# Patient Record
Sex: FEMALE | Race: WHITE | HISPANIC OR LATINO | Employment: STUDENT | URBAN - METROPOLITAN AREA
[De-identification: names, ages, dates, MRNs, and addresses within clinical notes are randomized per-mention and may not be internally consistent; named-entity substitution may affect disease eponyms.]

---
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
## 2021-05-02 ENCOUNTER — TELEPHONE (OUTPATIENT)
Dept: EMERGENCY MEDICINE | Facility: CLINIC | Age: 21
End: 2021-05-02

## 2021-05-02 ENCOUNTER — HOSPITAL ENCOUNTER (EMERGENCY)
Facility: CLINIC | Age: 21
Discharge: HOME | End: 2021-05-02
Attending: EMERGENCY MEDICINE | Admitting: EMERGENCY MEDICINE
Payer: COMMERCIAL

## 2021-05-02 VITALS
SYSTOLIC BLOOD PRESSURE: 123 MMHG | HEART RATE: 84 BPM | OXYGEN SATURATION: 99 % | BODY MASS INDEX: 24.15 KG/M2 | RESPIRATION RATE: 16 BRPM | TEMPERATURE: 99.1 F | DIASTOLIC BLOOD PRESSURE: 88 MMHG | HEIGHT: 60 IN | WEIGHT: 123 LBS

## 2021-05-02 DIAGNOSIS — J06.9 URI WITH COUGH AND CONGESTION: ICD-10-CM

## 2021-05-02 LAB
DEPRECATED S PYO AG THROAT QL EIA: NEGATIVE
FLUAV RNA RESP QL NAA+PROBE: NEGATIVE
FLUBV RNA RESP QL NAA+PROBE: NEGATIVE
LABORATORY COMMENT REPORT: ABNORMAL
RSV RNA SPEC QL NAA+PROBE: ABNORMAL
SARS-COV-2 RNA RESP QL NAA+PROBE: POSITIVE
SPECIMEN SOURCE: ABNORMAL
SPECIMEN SOURCE: NORMAL
SPECIMEN SOURCE: NORMAL
STREP GROUP A PCR: NOT DETECTED

## 2021-05-02 PROCEDURE — 99283 EMERGENCY DEPT VISIT LOW MDM: CPT

## 2021-05-02 PROCEDURE — 87651 STREP A DNA AMP PROBE: CPT | Performed by: EMERGENCY MEDICINE

## 2021-05-02 PROCEDURE — 87636 SARSCOV2 & INF A&B AMP PRB: CPT | Performed by: EMERGENCY MEDICINE

## 2021-05-02 PROCEDURE — 250N000012 HC RX MED GY IP 250 OP 636 PS 637: Performed by: EMERGENCY MEDICINE

## 2021-05-02 PROCEDURE — C9803 HOPD COVID-19 SPEC COLLECT: HCPCS

## 2021-05-02 PROCEDURE — 999N001174 HC STATISTIC STREP A RAPID: Performed by: EMERGENCY MEDICINE

## 2021-05-02 PROCEDURE — 250N000013 HC RX MED GY IP 250 OP 250 PS 637: Performed by: EMERGENCY MEDICINE

## 2021-05-02 RX ORDER — DEXAMETHASONE 4 MG/1
12 TABLET ORAL ONCE
Status: COMPLETED | OUTPATIENT
Start: 2021-05-02 | End: 2021-05-02

## 2021-05-02 RX ORDER — IBUPROFEN 600 MG/1
600 TABLET, FILM COATED ORAL ONCE
Status: COMPLETED | OUTPATIENT
Start: 2021-05-02 | End: 2021-05-02

## 2021-05-02 RX ADMIN — IBUPROFEN 600 MG: 600 TABLET, FILM COATED ORAL at 16:36

## 2021-05-02 RX ADMIN — DEXAMETHASONE 12 MG: 4 TABLET ORAL at 16:36

## 2021-05-02 ASSESSMENT — ENCOUNTER SYMPTOMS
RHINORRHEA: 1
FEVER: 0
SORE THROAT: 1
COUGH: 1
HEADACHES: 1

## 2021-05-02 ASSESSMENT — MIFFLIN-ST. JEOR: SCORE: 1249.42

## 2021-05-02 NOTE — ED PROVIDER NOTES
History   Chief Complaint:  Pharyngitis (sore throat, stuffy nose, stuffy ears and headache since friday)     DAV Lovett is a 20 year old female with history of asthma who presents with pharyngitis. 2 days ago, she developed rhinorrhea and watery eyes. She initially attributed her symptoms and treated with Claritin D with no relief. Her symptoms continued and she tried another allergy medication yesterday. However, yesterday evening she developed a sore throat, headache, otalgia, and cough, which she states is mostly to clear mucous from her throat. She also notes that her headache is worsened by movement and she has been diaphoretic at night. She denies concern for pregnancy.     Review of Systems   Constitutional: Negative for fever.   HENT: Positive for ear pain, rhinorrhea and sore throat.    Respiratory: Positive for cough.    Neurological: Positive for headaches.   All other systems reviewed and are negative.      Allergies:  Flu virus vaccine  Penicillins     Medications:  Albuterol inhaler  Tessalon   Dulera inhaler  Omeprazole  Desyrel     Past Medical History:    Depression  Asthma      Social History:  Presents to the ED alone     Physical Exam     Patient Vitals for the past 24 hrs:   BP Temp Temp src Pulse Resp SpO2 Height Weight   05/02/21 1730 -- -- -- 84 -- 99 % -- --   05/02/21 1444 123/88 99.1  F (37.3  C) Oral 108 16 98 % 1.524 m (5') 55.8 kg (123 lb)     Physical Exam  ENT:                Normal TM, oropharynx w/posterior cobblestoning  LN:                   Tender cervical adenopathy  Resp:               Non-labored, CTAB  Cardiac:           RRR  Neuro:             Alert and cooperative  MSkel:             Moving all extremities  Skin:                No rash    Emergency Department Course   Laboratory:  Symptomatic Influenza A/B & SARS-CoV2 (COVID19) Virus PCR Multiplex: SARSCOVRES positive!, o/w negative     Streptococcus A Rapid screen: negative   Group A Streptococcus PCR in  progress     Emergency Department Course:  Reviewed:  I reviewed nursing notes, vitals, past medical history and care everywhere    Assessments:  1625 I obtained history and examined the patient as noted above.   1724 I rechecked the patient and explained findings.     Interventions:  1636 Decadron, 12 mg, PO  1636 ibuprofen, 600 mg, PO    Disposition:  The patient was discharged to home.     Impression & Plan     Medical Decision Making:    Raisa Lovett is a 20 year old female who presents to the emergency department today for evaluation of a constellation of upper respiratory symptoms. Differential includes seasonal allergies, viral infections, COVID-19, and strep. Strep testing came back negative. There was no evidence for associated bacterial infection, such as otitis media or pneumonia on exam. Symptomatic medications were discussed. A dose of Decadron was given to help with her symptoms and her sore throat. COVID-19 testing is pending at this time and I will give her a call back when those results return.     COVID testing returned positive after discharge.  I was going to call her but noted that the lab nurse already had a note in regarding a call back and COVID follow-up instructions.    Covid-19  Raisa Lovett was evaluated during a global COVID-19 pandemic, which necessitated consideration that the patient might be at risk for infection with the SARS-CoV-2 virus that causes COVID-19.   Applicable protocols for evaluation were followed during the patient's care. COVID-19 was considered as part of the patient's evaluation. The plan for testing is: a test was obtained during this visit.    Diagnosis:    ICD-10-CM    1. URI with cough and congestion  J06.9 Symptomatic Influenza A/B & SARS-CoV2 (COVID-19) Virus PCR Multiplex     Streptococcus A Rapid Scr w Reflx to PCR     Group A Streptococcus PCR Throat Swab     Group A Streptococcus PCR Throat Swab     Scribe Disclosure:  Zenia MARTINEZ,  am serving as a scribe at 4:20 PM on 5/2/2021 to document services personally performed by Izzy Corea MD based on my observations and the provider's statements to me.              Izzy Corea MD  05/02/21 8396

## 2021-05-02 NOTE — DISCHARGE INSTRUCTIONS
Prescription strength dosing instructions:  - 600mg ibuprofen (Advil, Motrin) every 6 hours as needed for fever/pain/inflammation.  Naprosyn (Naproxen, Aleve) works similarly and can be used instead, if preferred.  - 650mg acetaminophen (tylenol) every 4 hours or 1000mg every 6 hours (maximum of 4000mg in 24 hours).  Acetaminophen is often in combination over the counter and prescription pain medications.  Be sure to include this in daily total.        These medications work differently and can be used in combination.      - over the counter Afrin nasal spray twice a day for 3 days helps with congestion  - Claritin D daily or sudafed both help with congestion

## 2021-05-02 NOTE — TELEPHONE ENCOUNTER
"-Coronavirus (COVID-19) Notification    Caller Name (Patient, parent, daughter/son, grandparent, etc)  Raisa, patient    Reason for call  Notify of Positive Coronavirus (COVID-19) lab results, assess symptoms,  review  [facility] recommendations    Lab Result    Lab test:  2019-nCoV rRt-PCR or SARS-CoV-2 PCR    Oropharyngeal AND/OR nasopharyngeal swabs is POSITIVE for 2019-nCoV RNA/SARS-COV-2 PCR (COVID-19 virus)    RN Recommendations/Instructions per [facility] Coronavirus COVID-19 recommendations    Brief introduction script  Introduce self then review script:  \"I am calling on behalf of [employer].  We were notified that your Coronavirus test (COVID-19) for was POSITIVE for the virus.  I have some information to relay to you but first I wanted to mention that the [place] Dept of Health will be contacting you shortly [it's possible MD already called Patient] to talk to you more about how you are feeling and other people you have had contact with who might now also have the virus.  Also,  [facility] is Partnering with the [facility] for Covid-19 research, you may be contacted directly by research staff.\"    Assessment (Inquire about Patient's current symptoms)   Assessment   Current Symptoms at time of phone call: (if no symptoms, document No symptoms] Runny nose, ear pain, headache   Symptoms onset (if applicable) 4/30/2021     If at time of call, Patients symptoms hare worsened, the Patient should contact 911 or have someone drive them to Emergency Dept promptly:      If Patient calling 911, inform 911 personal that you have tested positive for the Coronavirus (COVID-19).  Place mask on and await 911 to arrive.    If Emergency Dept, If possible, please have another adult drive you to the Emergency Dept but you need to wear mask when in contact with other people.      Monoclonal Antibody Administration    You may be eligible to receive a new treatment with a monoclonal antibody for " "preventing hospitalization in patients at high risk for complications from COVID-19.   This medication is still experimental and available on a limited basis; it is given through an IV and must be given at an infusion center. Please note that not all people who are eligible will receive the medication since it is in limited supply.     Are you interested in being considered for this medication?  No.   Does the patient fit the criteria: Patient declined    If patient qualifies based on above criteria:  \"You will be contacted if you are selected to receive this treatment in the next 1-2 business days.   This is time sensitive and if you are not selected in the next 1-2 business days, you will not receive the medication.  If you do not receive a call to schedule, you have not been selected.\"      Review information with Patient    Your result was positive. This means you have COVID-19 (coronavirus).  We have sent you a letter that reviews the information that I'll be reviewing with you now.    How can I protect others?    If you have symptoms: stay home and away from others (self-isolate) until:    You've had no fever--and no medicine that reduces fever--for 1 full day (24 hours). And       Your other symptoms have gotten better. For example, your cough or breathing has improved. And     At least 10 days have passed since your symptoms started. (If you've been told by a doctor that you have a weak immune system, wait 20 days.)     If you don't have symptoms: Stay home and away from others (self-isolate) until at least 10 days have passed since your first positive COVID-19 test. (Date test collected)    During this time:    Stay in your own room, including for meals. Use your own bathroom if you can.    Stay away from others in your home. No hugging, kissing or shaking hands. No visitors.     Don't go to work, school or anywhere else.     Clean  high touch  surfaces often (doorknobs, counters, handles, etc.). Use a " household cleaning spray or wipes. You'll find a full list on the EPA website at www.epa.gov/pesticide-registration/list-n-disinfectants-use-against-sars-cov-2.     Cover your mouth and nose with a mask, tissue or other face covering to avoid spreading germs.    Wash your hands and face often with soap and water.    Make a list of people you have been in close contact with recently, even if either of you wore a face covering.   ; Start your list from 2 days before you became ill or had a positive test.  ; Include anyone that was within 6 feet of you for a cumulative total of 15 minutes or more in 24 hours. (Example: if you sat next to Jerad for 5 minutes in the morning and 10 minutes in the afternoon, then you were in close contact for 15 minutes total that day. Jerad would be added to your list.)    A public health worker will call or text you. It is important that you answer. They will ask you questions about possible exposures to COVID-19, such as people you have been in direct contact with and places you have visited.    Tell the people on your list that you have COVID-19; they should stay away from others for 14 days starting from the last time they were in contact with you (unless you are told something different from a public health worker).     Caregivers in these groups are at risk for severe illness due to COVID-19:  o People 65 years and older  o People who live in a nursing home or long-term care facility  o People with chronic disease (lung, heart, cancer, diabetes, kidney, liver, immunologic)  o People who have a weakened immune system, including those who:  - Are in cancer treatment  - Take medicine that weakens the immune system, such as corticosteroids  - Had a bone marrow or organ transplant  - Have an immune deficiency  - Have poorly controlled HIV or AIDS  - Are obese (body mass index of 40 or higher)  - Smoke regularly    Caregivers should wear gloves while washing dishes, handling laundry and  cleaning bedrooms and bathrooms.    Wash and dry laundry with special caution. Don't shake dirty laundry, and use the warmest water setting you can.    If you have a weakened immune system, ask your doctor about other actions you should take.    For more tips, go to www.cdc.gov/coronavirus/2019-ncov/downloads/10Things.pdf.    You should not go back to work until you meet the guidelines above for ending your home isolation. You don't need to be retested for COVID-19 before going back to work--studies show that you won't spread the virus if it's been at least 10 days since your symptoms started (or 20 days, if you have a weak immune system).    Employers: This document serves as formal notice of your employee's medical guidelines for going back to work. They must meet the above guidelines before going back to work in person.    How can I take care of myself?    1. Get lots of rest. Drink extra fluids (unless a doctor has told you not to).    2. Take Tylenol (acetaminophen) for fever or pain. If you have liver or kidney problems, ask your family doctor if it's okay to take Tylenol.     Take either:     650 mg (two 325 mg pills) every 4 to 6 hours, or     1,000 mg (two 500 mg pills) every 8 hours as needed.     Note: Don't take more than 3,000 mg in one day. Acetaminophen is found in many medicines (both prescribed and over-the-counter medicines). Read all labels to be sure you don't take too much.    For children, check the Tylenol bottle for the right dose (based on their age or weight).    3. If you have other health problems (like cancer, heart failure, an organ transplant or severe kidney disease): Call your specialty clinic if you don't feel better in the next 2 days.    4. Know when to call 911: Emergency warning signs include:    Trouble breathing or shortness of breath    Pain or pressure in the chest that doesn't go away    Feeling confused like you haven't felt before, or not being able to wake  up    Bluish-colored lips or face    5. Sign up for [email or web address]. We know it's scary to hear that you have COVID-19. We want to track your symptoms to make sure you're okay over the next 2 weeks. Please look for an email from [email or web address]--this is a free, online program that we'll use to keep in touch. To sign up, follow the link in the email. Learn more at [email or web address].    Where can I get more information?    [facility]: [email or web address]    Coronavirus Basics: www.[email or web address]./diseases/coronavirus/basics.html    What to Do If You're Sick: www.cdc.gov/coronavirus/2019-ncov/about/steps-when-sick.html    Ending Home Isolation: www.cdc.gov/coronavirus/2019-ncov/hcp/disposition-in-home-patients.html     Caring for Someone with COVID-19: www.cdc.gov/coronavirus/2019-ncov/if-you-are-sick/care-for-someone.html     [facility] clinical trials (COVID-19 research studies): [email or web address]     A Positive COVID-19 letter will be sent via [employer] or the mail. (Exception, no letters sent to Presurgerical/Preprocedure Patients)    Macrina Tristan LPN

## 2021-05-02 NOTE — TELEPHONE ENCOUNTER
Coronavirus (COVID-19) Notification    Reason for call  Notify of POSITIVE  COVID-19 lab result, assess symptoms,  review [facility] recommendations    Lab Result   Lab test for 2019-nCoV rRt-PCR or SARS-COV-2 PCR  Oropharyngeal AND/OR nasopharyngeal swabs were POSITIVE for 2019-nCoV RNA [OR] SARS-COV-2 RNA (COVID-19) RNA     We have been unable to reach Patient by phone at this time to notify of their Positive COVID-19 result.  Left voicemail message requesting a call back to [phone] [facility] for results.        POSITIVE COVID-19 Letter sent.    Macrina Tristan LPN

## 2021-05-03 NOTE — RESULT ENCOUNTER NOTE
Group A Streptococcus PCR is NEGATIVE   No treatment or change in treatment [facility] ED lab result Strep Group A protocol.

## 2021-07-25 ENCOUNTER — HOSPITAL ENCOUNTER (EMERGENCY)
Facility: CLINIC | Age: 21
Discharge: HOME | End: 2021-07-25
Attending: EMERGENCY MEDICINE | Admitting: EMERGENCY MEDICINE
Payer: COMMERCIAL

## 2021-07-25 ENCOUNTER — APPOINTMENT (OUTPATIENT)
Dept: ULTRASOUND IMAGING | Facility: CLINIC | Age: 21
End: 2021-07-25
Attending: EMERGENCY MEDICINE
Payer: COMMERCIAL

## 2021-07-25 VITALS
DIASTOLIC BLOOD PRESSURE: 74 MMHG | HEART RATE: 83 BPM | RESPIRATION RATE: 20 BRPM | HEIGHT: 59 IN | WEIGHT: 120 LBS | BODY MASS INDEX: 24.19 KG/M2 | OXYGEN SATURATION: 97 % | TEMPERATURE: 98.5 F | SYSTOLIC BLOOD PRESSURE: 115 MMHG

## 2021-07-25 DIAGNOSIS — N93.9 VAGINAL BLEEDING: ICD-10-CM

## 2021-07-25 LAB
ALBUMIN SERPL-MCNC: 4.5 G/DL (ref 3.4–5)
ALP SERPL-CCNC: 68 U/L (ref 40–150)
ALT SERPL W P-5'-P-CCNC: 21 U/L (ref 0–50)
ANION GAP SERPL CALCULATED.3IONS-SCNC: 3 MMOL/L (ref 3–14)
AST SERPL W P-5'-P-CCNC: 17 U/L (ref 0–45)
B-HCG SERPL-ACNC: <1 IU/L (ref 0–5)
BILIRUB SERPL-MCNC: 0.5 MG/DL (ref 0.2–1.3)
BUN SERPL-MCNC: 7 MG/DL (ref 7–30)
CALCIUM SERPL-MCNC: 8.9 MG/DL (ref 8.5–10.1)
CHLORIDE BLD-SCNC: 111 MMOL/L (ref 94–109)
CO2 SERPL-SCNC: 25 MMOL/L (ref 20–32)
CREAT SERPL-MCNC: 0.55 MG/DL (ref 0.52–1.04)
ERYTHROCYTE [DISTWIDTH] IN BLOOD BY AUTOMATED COUNT: 13.2 % (ref 10–15)
GFR SERPL CREATININE-BSD FRML MDRD: >90 ML/MIN/1.73M2
GLUCOSE BLD-MCNC: 86 MG/DL (ref 70–99)
HCT VFR BLD AUTO: 39.9 % (ref 35–47)
HGB BLD-MCNC: 13.5 G/DL (ref 11.7–15.7)
MCH RBC QN AUTO: 30.5 PG (ref 26.5–33)
MCHC RBC AUTO-ENTMCNC: 33.8 G/DL (ref 31.5–36.5)
MCV RBC AUTO: 90 FL (ref 78–100)
PLATELET # BLD AUTO: 260 10E3/UL (ref 150–450)
POTASSIUM BLD-SCNC: 3.6 MMOL/L (ref 3.4–5.3)
PROT SERPL-MCNC: 8.6 G/DL (ref 6.8–8.8)
RBC # BLD AUTO: 4.42 10E6/UL (ref 3.8–5.2)
SODIUM SERPL-SCNC: 139 MMOL/L (ref 133–144)
WBC # BLD AUTO: 11.5 10E3/UL (ref 4–11)

## 2021-07-25 PROCEDURE — 258N000003 HC RX IP 258 OP 636: Performed by: EMERGENCY MEDICINE

## 2021-07-25 PROCEDURE — 36415 COLL VENOUS BLD VENIPUNCTURE: CPT | Performed by: EMERGENCY MEDICINE

## 2021-07-25 PROCEDURE — 84702 CHORIONIC GONADOTROPIN TEST: CPT | Performed by: EMERGENCY MEDICINE

## 2021-07-25 PROCEDURE — 96361 HYDRATE IV INFUSION ADD-ON: CPT

## 2021-07-25 PROCEDURE — 96360 HYDRATION IV INFUSION INIT: CPT

## 2021-07-25 PROCEDURE — 87491 CHLMYD TRACH DNA AMP PROBE: CPT | Performed by: EMERGENCY MEDICINE

## 2021-07-25 PROCEDURE — 80053 COMPREHEN METABOLIC PANEL: CPT | Performed by: EMERGENCY MEDICINE

## 2021-07-25 PROCEDURE — 76830 TRANSVAGINAL US NON-OB: CPT

## 2021-07-25 PROCEDURE — 87591 N.GONORRHOEAE DNA AMP PROB: CPT | Performed by: EMERGENCY MEDICINE

## 2021-07-25 PROCEDURE — 99285 EMERGENCY DEPT VISIT HI MDM: CPT | Mod: 25

## 2021-07-25 PROCEDURE — 85027 COMPLETE CBC AUTOMATED: CPT | Performed by: EMERGENCY MEDICINE

## 2021-07-25 RX ADMIN — SODIUM CHLORIDE 1000 ML: 9 INJECTION, SOLUTION INTRAVENOUS at 11:18

## 2021-07-25 ASSESSMENT — ENCOUNTER SYMPTOMS
ABDOMINAL PAIN: 1
NAUSEA: 0
VOMITING: 0

## 2021-07-25 ASSESSMENT — MIFFLIN-ST. JEOR: SCORE: 1219.95

## 2021-07-25 NOTE — ED PROVIDER NOTES
"  History   Chief Complaint:  Vaginal Bleeding       HPI   Raisa Lovett is a 20 year old female with history of ovarian cyst S/P dermoid cystectomy who presents with vaginal bleeding. The patient had a dermoid cystectomy done on 5/28. She reports that she has not stopped bleeding since then. She reports being prescribed a new birth control to help the bleeding but it has not stopped. She states she woke up today with heavy bleeding around 0800 that has not stopped. She states she went to work this morning where the cramping worsened. She reports going through around one regular Maxipad an hour x 3 today.  No nausea or vomiting.       Review of Systems   Gastrointestinal: Positive for abdominal pain (Cramping). Negative for nausea and vomiting.   Genitourinary: Positive for vaginal bleeding.     10 point review of systems performed and is negative except as above and in HPI.      Allergies:  Flu Virus Vaccine  Penicillins    Medications:  Lexapro  Drisdol  Alyacen  Albuterol  Tessalon  Omeprazole  Zofran  Trazodone    Past Medical History:    Depression  Asthma    Iron deficiency anemia  Eczema   TBI  GERD    Past Surgical History:    Dermoid Cystectomy  L & D, D & C Suction    Social History:  The patient presents alone.  She works at [employer].    Physical Exam     Patient Vitals for the past 24 hrs:   BP Temp Temp src Pulse Resp SpO2 Height Weight   07/25/21 1411 115/74 -- -- 83 -- 97 % -- --   07/25/21 1101 109/70 98.5  F (36.9  C) Temporal 99 20 96 % 1.499 m (4' 11\") 54.4 kg (120 lb)       Physical Exam  General: Alert, No distress. Nontoxic appearance  Head: No signs of trauma.   Mouth/Throat: Oropharynx moist.   Eyes: Conjunctivae are normal. Pupils are equal..   Neck: Normal range of motion.    CV: Appears well perfused.  Resp:No respiratory distress.   MSK: Normal range of motion. No obvious deformity.   Neuro: The patient is alert and interactive. DE SANTIAGO. Speech normal. GCS 15  Skin: No lesion or sign of " trauma noted.   Psych: normal mood and affect. behavior is normal.   : Moderate vaginal bleeding consistent with a heavy period, no cervical motion tenderness, no lacerations    Emergency Department Course     Imaging:  US Pelvis complete w Transvag & Doppler LmtPel Duplex Limited:  Normal pelvic ultrasound, as per radiology.       Laboratory:  CBC: WBC: 11.5 (H), HGB: 13.5, PLT: 260  CMP: Chloride: 111 (H), o/w WNL (Creatinine: 0.55)    HCG Quantitative Blood: <1    Emergency Department Course:    Reviewed:  I reviewed nursing notes, vitals, past medical history and care everywhere    Assessments:  1108 I obtained history and examined the patient as noted above.   1400 I rechecked the patient and performed a pelvic exam. She was comfortable with discharge after the exam.     Interventions:  1118: NS, 1 L, IV      Disposition:  The patient was discharged to home.       Impression & Plan     Medical Decision Making:    Raisa Lovett is a 20 year old female who presents for evaluation of vaginal bleeding.  The patient reports she has been using 1 pad per hour and sought treatment today because the heavy bleeding was not normal.  She has normal heart rate and is not hypotensive.  She is not pregnant.  Hgb normal with no marked thrombocytopenia or reason for anticoagulation.  Pelvic exam was done with a chaperone and is as above.  Pelvic US obtained and was normal as swell. Patient discharged in stable condition with strict return precautions.    Diagnosis:    ICD-10-CM    1. Vaginal bleeding  N93.9        Scribe Disclosure:  I, Fabián Kulkarni, am serving as a scribe at 11:07 AM on 7/25/2021 to document services personally performed by Georgina Dukes MD based on my observations and the provider's statements to me.            Georgina Dukes MD  07/25/21 [id number]

## 2021-07-25 NOTE — DISCHARGE INSTRUCTIONS
Discharge Instructions  Vaginal Bleeding    You were seen today for unusual vaginal bleeding. Heavy or irregular bleeding may be caused by many different things such as hormone changes, infection, birth control, or cancer. At this time your provider does not find that your bleeding is a sign of anything dangerous or life-threatening, and you have not lost enough blood to be dangerous.  However, sometimes the signs of serious illness do not show up right away.  If you have new or worse symptoms, you may need to be seen again in the Emergency Department or by your primary provider.      Generally, every Emergency Department visit should have a follow-up clinic visit with either a primary or a specialty clinic/provider. Please follow-up as instructed by your emergency provider today.    Return to the Emergency Department if:  You feel lightheaded or faint.  Your bleeding becomes much heavier than it is now.  You have severe cramping or abdominal (belly) pain.  You have any new or different symptoms.    Treatment:  Motrin  or Advil  (ibuprofen) can help relieve cramps and can also decrease bleeding. You may use this according to the directions on the package. Do not use a medicine that you are allergic to, or if your provider has told you not to use it.  Hormone pills or birth control pills are occasionally prescribed to help control the bleeding but often this is left to an OB/GYN provider. These can cause problems if you have a history of blood clots or stroke, so tell your provider if you have these problems before you leave.  Iron tablets may be recommended if you have anemia (low blood count.) Iron can cause constipation, so be sure to have plenty of fiber in your diet and let your provider know if you have problems.  Drinking plenty of fluid is important. Be sure to drink extra water or other healthy drinks.  If you were given a prescription for medicine here today, be sure to read all of the information  (including the package insert) that comes with your prescription.  This will include important information about the medicine, its side effects, and any warnings that you need to know about.  The pharmacist who fills the prescription can provide more information and answer questions you may have about the medicine.  If you have questions or concerns that the pharmacist cannot address, please call or return to the Emergency Department.     Remember that you can always come back to the Emergency Department if you are not able to see your regular provider in the amount of time listed above, if you get any new symptoms, or if there is anything that worries you.

## 2021-07-25 NOTE — LETTER
July 25, 2021      To Whom It May Concern:      Raisa Lovett was seen in our Emergency Department today, 07/25/21.  She may return to work after 7/25/2021.    Sincerely,        CLEVE Schulz

## 2021-07-25 NOTE — ED TRIAGE NOTES
Pt had a gyn surgery a month ago and has continued to bleed. Pt. Is concerned about the timing and amount of blood loss. One pad saturated every hour started today.

## 2021-07-26 LAB
C TRACH DNA SPEC QL NAA+PROBE: NEGATIVE
N GONORRHOEA DNA SPEC QL NAA+PROBE: NEGATIVE

## 2021-07-26 NOTE — RESULT ENCOUNTER NOTE
Final result for both N. Gonorrhoeae PCR and Chlamydia Trachomatis PCR are NEGATIVE.  No treatment or change in treatment per [facility] ED Lab Result N. Gonorrhea AND/OR Chlamydia T. protocol.

## 2024-09-10 ENCOUNTER — OFFICE VISIT (OUTPATIENT)
Dept: URGENT CARE | Facility: URGENT CARE | Age: 24
End: 2024-09-10
Payer: COMMERCIAL

## 2024-09-10 VITALS
SYSTOLIC BLOOD PRESSURE: 127 MMHG | OXYGEN SATURATION: 95 % | WEIGHT: 113.9 LBS | DIASTOLIC BLOOD PRESSURE: 87 MMHG | BODY MASS INDEX: 23.01 KG/M2 | HEART RATE: 67 BPM | RESPIRATION RATE: 16 BRPM | TEMPERATURE: 98.7 F

## 2024-09-10 DIAGNOSIS — G43.809 OTHER MIGRAINE WITHOUT STATUS MIGRAINOSUS, NOT INTRACTABLE: ICD-10-CM

## 2024-09-10 DIAGNOSIS — J02.0 STREP THROAT: ICD-10-CM

## 2024-09-10 DIAGNOSIS — R11.2 NAUSEA AND VOMITING, UNSPECIFIED VOMITING TYPE: Primary | ICD-10-CM

## 2024-09-10 LAB
DEPRECATED S PYO AG THROAT QL EIA: NEGATIVE
GROUP A STREP BY PCR: DETECTED

## 2024-09-10 PROCEDURE — 99203 OFFICE O/P NEW LOW 30 MIN: CPT | Mod: 25 | Performed by: PHYSICIAN ASSISTANT

## 2024-09-10 PROCEDURE — 96372 THER/PROPH/DIAG INJ SC/IM: CPT | Performed by: PHYSICIAN ASSISTANT

## 2024-09-10 PROCEDURE — 87651 STREP A DNA AMP PROBE: CPT | Performed by: PHYSICIAN ASSISTANT

## 2024-09-10 RX ORDER — KETOROLAC TROMETHAMINE 30 MG/ML
30 INJECTION, SOLUTION INTRAMUSCULAR; INTRAVENOUS ONCE
Status: COMPLETED | OUTPATIENT
Start: 2024-09-10 | End: 2024-09-10

## 2024-09-10 RX ORDER — ONDANSETRON 4 MG/1
4 TABLET, ORALLY DISINTEGRATING ORAL ONCE
Status: COMPLETED | OUTPATIENT
Start: 2024-09-10 | End: 2024-09-10

## 2024-09-10 RX ORDER — ONDANSETRON 4 MG/1
4 TABLET, ORALLY DISINTEGRATING ORAL EVERY 8 HOURS PRN
Qty: 15 TABLET | Refills: 0 | Status: SHIPPED | OUTPATIENT
Start: 2024-09-10

## 2024-09-10 RX ADMIN — ONDANSETRON 4 MG: 4 TABLET, ORALLY DISINTEGRATING ORAL at 12:29

## 2024-09-10 RX ADMIN — KETOROLAC TROMETHAMINE 30 MG: 30 INJECTION, SOLUTION INTRAMUSCULAR; INTRAVENOUS at 12:24

## 2024-09-10 NOTE — PROGRESS NOTES
Assessment & Plan     MDM:  pt was seen for 3 day hx  HA, nausea, vomiting.  She has a hx of migraine, this HA is different in that is lasting longer than typical. She has no red flags, fevers, normal neurologic exam.  Given toradol and zofran in clinic, will have her monitor at home but if worsneing or not improving seek care in the ED for additional treatment options.    RST negative in clinic.  Pt has had some lower abdomen discomfort, she defers further work up of that today stating she thinks it may be from getting her menses today which is not unusual.  She also had been vomiting and wonders if it is muscular.  She agrees to seek further evaluation if that does not improve as the vomiting improves or not resolved with menses.     Nausea and vomiting, unspecified vomiting type  Zofran as ordered.    - Streptococcus A Rapid Screen w/Reflex to PCR - Clinic Collect  - Group A Streptococcus PCR Throat Swab  - ondansetron (ZOFRAN ODT) 4 MG ODT tab  Dispense: 15 tablet; Refill: 0  - ondansetron (ZOFRAN ODT) ODT tab 4 mg    Other migraine without status migrainosus, not intractable  Pt has no red flags,  she has no neurologic deficit. Hx of migraine with similar character, but lasting longer than typical.    Toradol given in clinic.    Discussed resting at home, dark room, push fluids.  She reports in the past has needed IVF, however discussed not available in urgent care setting.  If she is not improving as expected she should seek care in the ED.  Pt agreeable with plan.      - ondansetron (ZOFRAN ODT) 4 MG ODT tab  Dispense: 15 tablet; Refill: 0  - ketorolac (TORADOL) injection 30 mg           Floridalma Ahumada PA-C  [facility] PONCHO Kline is a 23 year old female who presents to clinic today for the following health issues:  Chief Complaint   Patient presents with    Urgent Care     Pt complains of really bad HA moves to the right and bilateral ear pain onset Sunday - Hx of  migraines   Nausea and vomiting x 2 days  Menstrual cycle started today, feels really fatigue and weak and hot flashes   Also experiencing lower abdominal pain x 2 days   Hydrating, drinking sprite/ginger ale - did not take any otc medication   Denies sore throat, cough and no fever        HPI  Pt is a 23 year old female who presents to urgent car with concerns re: headache, nausea/vomiting, fatigue.    3 days of HA, nausea, vomiting.    Pt denies fever.    Hx of migraines following TBI. She has occasional migraines that are unilateral throbbing, photophobia, but usually last just a day and improve with conservative measures of rest, fluids. In the past ibuprofen/tylenol not helpful.   This HA lasting longer. Thobbing, bilateral, photophobia,  Radiate to the ears which is not typical.   L ear pain.  No hearing change.   Gagging and lower abdomen pain due to vomiting.    Lower abdomen discomfort is more with the act of vomiting.  No pain when not vomiting.   In the past does dark room, pressure, rest. Usually goes away with rest and hydrate but this time longer.  Pt states she just got menses today, states discomfort may be due to her onset of menses as well.    No uri sx. No cough. No sore throat.    No rhinorrhea.    No fevers.    Denies concern for pregnancy or STI, not sexually active.       Review of Systems  Constitutional, HEENT, cardiovascular, pulmonary, gi and gu systems are negative, except as otherwise noted.      Objective    /87 (BP Location: Left arm, Patient Position: Sitting, Cuff Size: Adult Small)   Pulse 67   Temp 98.7  F (37.1  C) (Oral)   Resp 16   Wt 51.7 kg (113 lb 14.4 oz)   LMP 09/10/2024 (Exact Date)   SpO2 95%   Breastfeeding No   BMI 23.01 kg/m    Physical Exam   Pt is in no acute distress and appears well  Ears patent B:  TM s intact, non-injected. All land marks easily visibile    Nasal mucosa is non-edematous, no discharge.    Pharynx: non erythematous, tonsils non  hypertrophied, No exudate   Neck supple: no adenopathy  Lungs: CTA  Heart: RRR, no murmur, no thrills or heaves   Ext: no edema  Skin: no rashes    A and O x 3  Neck: non-tender midline with full AROM    PERRL  EOMI  CN 2-12 grossly intact  Muscular strength, sensation and DTR are symmetrical and WNL for upper and lower ext  Rhomburg test is negative  Pt is able to ambulate on heels and toes.    Heel to toe walk easily.

## 2024-09-11 RX ORDER — AZITHROMYCIN 500 MG/1
500 TABLET, FILM COATED ORAL DAILY
Qty: 5 TABLET | Refills: 0 | Status: SHIPPED | OUTPATIENT
Start: 2024-09-11 | End: 2024-09-16

## 2025-02-20 ENCOUNTER — HOSPITAL ENCOUNTER (EMERGENCY)
Facility: CLINIC | Age: 25
End: 2025-02-20
Payer: COMMERCIAL

## 2025-02-20 ENCOUNTER — APPOINTMENT (OUTPATIENT)
Dept: GENERAL RADIOLOGY | Facility: CLINIC | Age: 25
End: 2025-02-20
Attending: EMERGENCY MEDICINE
Payer: COMMERCIAL

## 2025-02-20 VITALS
SYSTOLIC BLOOD PRESSURE: 144 MMHG | HEART RATE: 79 BPM | RESPIRATION RATE: 16 BRPM | OXYGEN SATURATION: 99 % | TEMPERATURE: 98.1 F | DIASTOLIC BLOOD PRESSURE: 96 MMHG

## 2025-02-20 DIAGNOSIS — J45.909 ASTHMA, UNSPECIFIED ASTHMA SEVERITY, UNSPECIFIED WHETHER COMPLICATED, UNSPECIFIED WHETHER PERSISTENT: ICD-10-CM

## 2025-02-20 DIAGNOSIS — R06.02 SHORTNESS OF BREATH: ICD-10-CM

## 2025-02-20 LAB
FLUAV RNA SPEC QL NAA+PROBE: NEGATIVE
FLUBV RNA RESP QL NAA+PROBE: NEGATIVE
RSV RNA SPEC NAA+PROBE: NEGATIVE
SARS-COV-2 RNA RESP QL NAA+PROBE: NEGATIVE

## 2025-02-20 PROCEDURE — 87637 SARSCOV2&INF A&B&RSV AMP PRB: CPT | Performed by: EMERGENCY MEDICINE

## 2025-02-20 PROCEDURE — 93005 ELECTROCARDIOGRAM TRACING: CPT

## 2025-02-20 PROCEDURE — 99285 EMERGENCY DEPT VISIT HI MDM: CPT | Mod: 25

## 2025-02-20 PROCEDURE — 71046 X-RAY EXAM CHEST 2 VIEWS: CPT

## 2025-02-20 ASSESSMENT — ACTIVITIES OF DAILY LIVING (ADL)
ADLS_ACUITY_SCORE: 41
ADLS_ACUITY_SCORE: 41

## 2025-02-20 ASSESSMENT — COLUMBIA-SUICIDE SEVERITY RATING SCALE - C-SSRS
1. IN THE PAST MONTH, HAVE YOU WISHED YOU WERE DEAD OR WISHED YOU COULD GO TO SLEEP AND NOT WAKE UP?: NO
2. HAVE YOU ACTUALLY HAD ANY THOUGHTS OF KILLING YOURSELF IN THE PAST MONTH?: NO
6. HAVE YOU EVER DONE ANYTHING, STARTED TO DO ANYTHING, OR PREPARED TO DO ANYTHING TO END YOUR LIFE?: NO

## 2025-02-20 NOTE — LETTER
February 21, 2025      To Whom It May Concern:      Raisa Lovett was seen in our Emergency Department on 02/20/25.  I expect her condition to improve over the next few days.  She may return to work/school 2/24/25.    Sincerely,        CLEVE Ricks  Electronically signed

## 2025-02-21 ENCOUNTER — APPOINTMENT (OUTPATIENT)
Dept: CT IMAGING | Facility: CLINIC | Age: 25
End: 2025-02-21
Attending: EMERGENCY MEDICINE
Payer: COMMERCIAL

## 2025-02-21 VITALS
DIASTOLIC BLOOD PRESSURE: 80 MMHG | SYSTOLIC BLOOD PRESSURE: 109 MMHG | TEMPERATURE: 98.1 F | OXYGEN SATURATION: 96 % | HEART RATE: 75 BPM | RESPIRATION RATE: 16 BRPM

## 2025-02-21 LAB
ALBUMIN SERPL BCG-MCNC: 4.7 G/DL (ref 3.5–5.2)
ALP SERPL-CCNC: 69 U/L (ref 40–150)
ALT SERPL W P-5'-P-CCNC: 12 U/L (ref 0–50)
ANION GAP SERPL CALCULATED.3IONS-SCNC: 12 MMOL/L (ref 7–15)
AST SERPL W P-5'-P-CCNC: 15 U/L (ref 0–45)
ATRIAL RATE - MUSE: 66 BPM
BASOPHILS # BLD AUTO: 0.1 10E3/UL (ref 0–0.2)
BASOPHILS NFR BLD AUTO: 1 %
BILIRUB SERPL-MCNC: 0.9 MG/DL
BUN SERPL-MCNC: 6.3 MG/DL (ref 6–20)
CALCIUM SERPL-MCNC: 9.4 MG/DL (ref 8.8–10.4)
CHLORIDE SERPL-SCNC: 103 MMOL/L (ref 98–107)
CREAT SERPL-MCNC: 0.5 MG/DL (ref 0.51–0.95)
D DIMER PPP FEU-MCNC: 0.61 UG/ML FEU (ref 0–0.5)
DIASTOLIC BLOOD PRESSURE - MUSE: NORMAL MMHG
EGFRCR SERPLBLD CKD-EPI 2021: >90 ML/MIN/1.73M2
EOSINOPHIL # BLD AUTO: 0.1 10E3/UL (ref 0–0.7)
EOSINOPHIL NFR BLD AUTO: 1 %
ERYTHROCYTE [DISTWIDTH] IN BLOOD BY AUTOMATED COUNT: 12.7 % (ref 10–15)
GLUCOSE SERPL-MCNC: 101 MG/DL (ref 70–99)
HCO3 SERPL-SCNC: 24 MMOL/L (ref 22–29)
HCT VFR BLD AUTO: 39.1 % (ref 35–47)
HGB BLD-MCNC: 13.5 G/DL (ref 11.7–15.7)
HOLD SPECIMEN: NORMAL
IMM GRANULOCYTES # BLD: 0 10E3/UL
IMM GRANULOCYTES NFR BLD: 0 %
INTERPRETATION ECG - MUSE: NORMAL
LIPASE SERPL-CCNC: 21 U/L (ref 13–60)
LYMPHOCYTES # BLD AUTO: 2.7 10E3/UL (ref 0.8–5.3)
LYMPHOCYTES NFR BLD AUTO: 27 %
MCH RBC QN AUTO: 31.2 PG (ref 26.5–33)
MCHC RBC AUTO-ENTMCNC: 34.5 G/DL (ref 31.5–36.5)
MCV RBC AUTO: 90 FL (ref 78–100)
MONOCYTES # BLD AUTO: 0.6 10E3/UL (ref 0–1.3)
MONOCYTES NFR BLD AUTO: 6 %
NEUTROPHILS # BLD AUTO: 6.6 10E3/UL (ref 1.6–8.3)
NEUTROPHILS NFR BLD AUTO: 65 %
NRBC # BLD AUTO: 0 10E3/UL
NRBC BLD AUTO-RTO: 0 /100
P AXIS - MUSE: 17 DEGREES
PLATELET # BLD AUTO: 262 10E3/UL (ref 150–450)
POTASSIUM SERPL-SCNC: 3.7 MMOL/L (ref 3.4–5.3)
PR INTERVAL - MUSE: 170 MS
PROT SERPL-MCNC: 8 G/DL (ref 6.4–8.3)
QRS DURATION - MUSE: 86 MS
QT - MUSE: 384 MS
QTC - MUSE: 402 MS
R AXIS - MUSE: 72 DEGREES
RBC # BLD AUTO: 4.33 10E6/UL (ref 3.8–5.2)
SODIUM SERPL-SCNC: 139 MMOL/L (ref 135–145)
SYSTOLIC BLOOD PRESSURE - MUSE: NORMAL MMHG
T AXIS - MUSE: 60 DEGREES
TROPONIN T SERPL HS-MCNC: <6 NG/L
VENTRICULAR RATE- MUSE: 66 BPM
WBC # BLD AUTO: 10.1 10E3/UL (ref 4–11)

## 2025-02-21 PROCEDURE — 71275 CT ANGIOGRAPHY CHEST: CPT

## 2025-02-21 PROCEDURE — 83690 ASSAY OF LIPASE: CPT | Performed by: EMERGENCY MEDICINE

## 2025-02-21 PROCEDURE — 80053 COMPREHEN METABOLIC PANEL: CPT | Performed by: EMERGENCY MEDICINE

## 2025-02-21 PROCEDURE — 250N000013 HC RX MED GY IP 250 OP 250 PS 637: Performed by: EMERGENCY MEDICINE

## 2025-02-21 PROCEDURE — 36415 COLL VENOUS BLD VENIPUNCTURE: CPT | Performed by: EMERGENCY MEDICINE

## 2025-02-21 PROCEDURE — 84484 ASSAY OF TROPONIN QUANT: CPT | Performed by: EMERGENCY MEDICINE

## 2025-02-21 PROCEDURE — 250N000011 HC RX IP 250 OP 636: Performed by: EMERGENCY MEDICINE

## 2025-02-21 PROCEDURE — 85379 FIBRIN DEGRADATION QUANT: CPT | Performed by: EMERGENCY MEDICINE

## 2025-02-21 PROCEDURE — 85025 COMPLETE CBC W/AUTO DIFF WBC: CPT | Performed by: EMERGENCY MEDICINE

## 2025-02-21 PROCEDURE — 250N000009 HC RX 250: Performed by: EMERGENCY MEDICINE

## 2025-02-21 RX ORDER — IOPAMIDOL 755 MG/ML
55 INJECTION, SOLUTION INTRAVASCULAR ONCE
Status: COMPLETED | OUTPATIENT
Start: 2025-02-21 | End: 2025-02-21

## 2025-02-21 RX ORDER — PREDNISONE 20 MG/1
TABLET ORAL
Qty: 10 TABLET | Refills: 0 | Status: SHIPPED | OUTPATIENT
Start: 2025-02-21

## 2025-02-21 RX ORDER — OMEPRAZOLE 40 MG/1
40 CAPSULE, DELAYED RELEASE ORAL DAILY
Qty: 30 CAPSULE | Refills: 0 | Status: SHIPPED | OUTPATIENT
Start: 2025-02-21

## 2025-02-21 RX ORDER — MAGNESIUM HYDROXIDE/ALUMINUM HYDROXICE/SIMETHICONE 120; 1200; 1200 MG/30ML; MG/30ML; MG/30ML
15 SUSPENSION ORAL ONCE
Status: COMPLETED | OUTPATIENT
Start: 2025-02-21 | End: 2025-02-21

## 2025-02-21 RX ADMIN — ALUMINUM HYDROXIDE, MAGNESIUM HYDROXIDE, AND SIMETHICONE 15 ML: 200; 200; 20 SUSPENSION ORAL at 01:07

## 2025-02-21 RX ADMIN — IOPAMIDOL 55 ML: 755 INJECTION, SOLUTION INTRAVENOUS at 01:18

## 2025-02-21 RX ADMIN — SODIUM CHLORIDE 82 ML: 9 INJECTION, SOLUTION INTRAVENOUS at 01:18

## 2025-02-21 ASSESSMENT — ACTIVITIES OF DAILY LIVING (ADL)
ADLS_ACUITY_SCORE: 41
ADLS_ACUITY_SCORE: 41

## 2025-02-21 NOTE — ED PROVIDER NOTES
Emergency Department Note      History of Present Illness     Chief Complaint   Cough      HPI   Raisa Lovett is a 24 year old female who presented to the emergency department with a cough.  Patient reports a cough for a few days and feels that she is spitting up saliva.  In the morning, she has noted some blood in the saliva.  She denies vomiting but does report she has been burping.  Feels that there is pain when breathing.  No diarrhea urinary symptoms.  No abdominal pain.  No leg pain or leg swelling.    Independent Historian   None    Past Medical History     Medical History and Problem List   Past Medical History:   Diagnosis Date    Depression     Uncomplicated asthma      Medications   albuterol (PROAIR HFA/PROVENTIL HFA/VENTOLIN HFA) 108 (90 BASE) MCG/ACT Inhaler  albuterol (PROAIR HFA/PROVENTIL HFA/VENTOLIN HFA) 108 (90 BASE) MCG/ACT Inhaler  benzonatate (TESSALON) 200 MG capsule  mometasone-formoterol (DULERA) 100-5 MCG/ACT oral inhaler  OMEPRAZOLE PO  ondansetron (ZOFRAN ODT) 4 MG ODT tab  ondansetron (ZOFRAN ODT) 4 MG ODT tab  predniSONE (DELTASONE) 20 MG tablet  TRAZODONE HCL PO      Surgical History   No prior surgeries    Physical Exam     Patient Vitals for the past 24 hrs:   BP Temp Pulse Resp SpO2   02/21/25 0100 109/80 -- 75 -- 96 %   02/21/25 0030 130/82 -- 68 -- 100 %   02/20/25 2120 (!) 144/96 98.1  F (36.7  C) 79 16 99 %     Physical Exam  General: Sitting up in bed  Eyes:  The pupils are equal and round    Conjunctivae and sclerae are normal  ENT:    Atraumatic face  Neck:  Normal range of motion  CV:  Regular rate, regular rhythm     Skin warm and well perfused   Resp:  Non labored breathing on room air    No tachypnea    No cough heard    Lungs clear bilaterally  GI:  Abdomen is soft, there is no rigidity    No distension    No rebound tenderness     No abdominal tenderness  MS:  Normal muscular tone  Skin:  No rash or acute skin lesions noted  Neuro:   Awake, alert.      Speech  is normal and fluent.    Face is symmetric.     Moves all extremities equally  Psych: Normal affect.  Appropriate interactions.    Diagnostics     Lab Results   Labs Ordered and Resulted from Time of ED Arrival to Time of ED Departure   D DIMER QUANTITATIVE - Abnormal       Result Value    D-Dimer Quantitative 0.61 (*)    COMPREHENSIVE METABOLIC PANEL - Abnormal    Sodium 139      Potassium 3.7      Carbon Dioxide (CO2) 24      Anion Gap 12      Urea Nitrogen 6.3      Creatinine 0.50 (*)     GFR Estimate >90      Calcium 9.4      Chloride 103      Glucose 101 (*)     Alkaline Phosphatase 69      AST 15      ALT 12      Protein Total 8.0      Albumin 4.7      Bilirubin Total 0.9     INFLUENZA A/B, RSV AND SARS-COV2 PCR - Normal    Influenza A PCR Negative      Influenza B PCR Negative      RSV PCR Negative      SARS CoV2 PCR Negative     LIPASE - Normal    Lipase 21     TROPONIN T, HIGH SENSITIVITY - Normal    Troponin T, High Sensitivity <6     CBC WITH PLATELETS AND DIFFERENTIAL    WBC Count 10.1      RBC Count 4.33      Hemoglobin 13.5      Hematocrit 39.1      MCV 90      MCH 31.2      MCHC 34.5      RDW 12.7      Platelet Count 262      % Neutrophils 65      % Lymphocytes 27      % Monocytes 6      % Eosinophils 1      % Basophils 1      % Immature Granulocytes 0      NRBCs per 100 WBC 0      Absolute Neutrophils 6.6      Absolute Lymphocytes 2.7      Absolute Monocytes 0.6      Absolute Eosinophils 0.1      Absolute Basophils 0.1      Absolute Immature Granulocytes 0.0      Absolute NRBCs 0.0       Imaging   CT Chest Pulmonary Embolism w Contrast   Final Result   IMPRESSION:   1.  Negative chest CTA. No pulmonary emboli identified.         Chest XR,  PA & LAT   Final Result   IMPRESSION: Negative chest.        EKG   ECG results from 02/20/25   EKG 12-lead, tracing only     Value    Systolic Blood Pressure     Diastolic Blood Pressure     Ventricular Rate 66    Atrial Rate 66    FL Interval 170    QRS Duration  86        QTc 402    P Axis 17    R AXIS 72    T Axis 60    Interpretation ECG      Sinus rhythm  Normal ECG  No previous ECGs available       Independent Interpretation   CXR: No pneumothorax.    ED Course      Medications Administered   Medications   alum & mag hydroxide-simethicone (MAALOX) suspension 15 mL (15 mLs Oral $Given 2/21/25 0107)   iopamidol (ISOVUE-370) solution 55 mL (55 mLs Intravenous $Given 2/21/25 0118)   sodium chloride 0.9 % bag 500mL for CT scan flush use (82 mLs Intravenous $Given 2/21/25 0118)       ED Course   Past medical records, nursing notes, and vitals reviewed.    Medical Decision Making / Diagnosis   CT for PE was ordered because the patient had an abnormal d-dimer.     GAVI Lovett is a 24 year old female who presented to the emergency department with cough.  Feels slightly short of breath.  Some of her symptoms almost sound like acid reflux though she does not feel that it is acid reflux.  Her lungs are clear on exam.  Chest x-ray was done prior to my evaluation and normal.  Given her symptoms, I did obtain D-dimer which was mildly elevated.  This prompted CT chest which shows no PE.Lung's are clear on CT with no evidence of pneumonia.  She does have a history of asthma and feels that she is using her inhaler more than usual and so potentially her symptoms are related to mild asthma exacerbation so will put her on prednisone.  Given my concern that there may be a component of acid reflux as well, recommend taking PPI.  I did discuss that prednisone can sometimes worsen acid reflux but hopefully these medications will improve her symptoms.  She will continue to use her albuterol as needed.  No abdominal tenderness on exam to suggest need for abdominal imaging.    Disposition   The patient was discharged.     Diagnosis     ICD-10-CM    1. Asthma, unspecified asthma severity, unspecified whether complicated, unspecified whether persistent  J45.909       2.  Shortness of breath  R06.02          Discharge Medications   Discharge Medication List as of 2/21/2025  2:45 AM        START taking these medications    Details   !! omeprazole (PRILOSEC) 40 MG DR capsule Take 1 capsule (40 mg) by mouth daily., Disp-30 capsule, R-0, E-Prescribe      !! predniSONE (DELTASONE) 20 MG tablet Take two tablets (= 40mg) each day for 5 (five) days, Disp-10 tablet, R-0, E-Prescribe       !! - Potential duplicate medications found. Please discuss with provider.        MD Carol Camarillo, Gianna Mendez MD  02/21/25 [id number]

## 2025-02-21 NOTE — DISCHARGE INSTRUCTIONS
Your lungs are clear on the CT scan today  No signs of pneumonia or blood clot  No signs of heart issues  Use prednisone to treat for possible asthma  Continue inhalers  Add omeprazole to make sure that we are also treating possible acid reflux  Hopefully these will improve things

## 2025-02-21 NOTE — ED TRIAGE NOTES
"Pt arrives via triage presenting with \"lung pain and cough.\" Pt states 5 days of asthma exacerbation but now endorses \"lungs are filling up.\" Pt used inhaler PTA     Triage Assessment (Adult)       Row Name 02/20/25 7018          Triage Assessment    Airway WDL WDL        Respiratory WDL    Respiratory WDL cough        Cardiac WDL    Cardiac WDL WDL        Peripheral/Neurovascular WDL    Peripheral Neurovascular WDL WDL        Cognitive/Neuro/Behavioral WDL    Cognitive/Neuro/Behavioral WDL WDL                     "

## 2025-05-08 ENCOUNTER — OFFICE VISIT (OUTPATIENT)
Dept: URGENT CARE | Facility: URGENT CARE | Age: 25
End: 2025-05-08
Payer: COMMERCIAL

## 2025-05-08 VITALS
OXYGEN SATURATION: 98 % | RESPIRATION RATE: 16 BRPM | WEIGHT: 114 LBS | HEART RATE: 68 BPM | DIASTOLIC BLOOD PRESSURE: 86 MMHG | TEMPERATURE: 98.6 F | BODY MASS INDEX: 23.03 KG/M2 | SYSTOLIC BLOOD PRESSURE: 121 MMHG

## 2025-05-08 DIAGNOSIS — J45.901 EXACERBATION OF ASTHMA, UNSPECIFIED ASTHMA SEVERITY, UNSPECIFIED WHETHER PERSISTENT: Primary | ICD-10-CM

## 2025-05-08 RX ORDER — PREDNISONE 20 MG/1
40 TABLET ORAL DAILY
Qty: 10 TABLET | Refills: 0 | Status: SHIPPED | OUTPATIENT
Start: 2025-05-08 | End: 2025-05-13

## 2025-05-08 ASSESSMENT — ENCOUNTER SYMPTOMS
WHEEZING: 1
SHORTNESS OF BREATH: 1
ABDOMINAL PAIN: 0
CHEST TIGHTNESS: 1
FEVER: 0
VOMITING: 0
NAUSEA: 1
SORE THROAT: 0
RHINORRHEA: 0

## 2025-05-08 NOTE — PROGRESS NOTES
"SUBJECTIVE:   Raisa Lovett is a 24 year old female presenting with a chief complaint of   Chief Complaint   Patient presents with    Urgent Care     Asthma flare up- pain with deep breath and  SOB, wheezing, feels like fluid in lungs, nausea x last week Sunday - seen for similar sx as 2/20 at  ED        She is an established patient of [facility].  Patient presents with \"lungs hurting\" x 11 days.   Hx of asthma.  Patient not hospitalized or intubated for in past.  Second times since February on steroids    Treatment:  tylenol earlier today; last albuterol was at 8:30.  Taking dulera      Review of Systems   Constitutional:  Negative for fever.   HENT:  Negative for congestion, ear pain, rhinorrhea and sore throat.    Respiratory:  Positive for chest tightness, shortness of breath and wheezing.    Cardiovascular:  Positive for chest pain.   Gastrointestinal:  Positive for nausea. Negative for abdominal pain and vomiting.   Skin:  Negative for rash.   All other systems reviewed and are negative.      Past Medical History:   Diagnosis Date    Depression     Uncomplicated asthma      No family history on file.  Current Outpatient Medications   Medication Sig Dispense Refill    albuterol (PROAIR HFA/PROVENTIL HFA/VENTOLIN HFA) 108 (90 BASE) MCG/ACT Inhaler Inhale 2 puffs into the lungs every 6 hours as needed for shortness of breath / dyspnea or wheezing 1 Inhaler 0    albuterol (PROAIR HFA/PROVENTIL HFA/VENTOLIN HFA) 108 (90 BASE) MCG/ACT Inhaler Inhale 2 puffs into the lungs every 6 hours      mometasone-formoterol (DULERA) 100-5 MCG/ACT oral inhaler Inhale 2 puffs into the lungs 2 times daily      omeprazole (PRILOSEC) 40 MG DR capsule Take 1 capsule (40 mg) by mouth daily. 30 capsule 0    ondansetron (ZOFRAN ODT) 4 MG ODT tab Take 1 tablet (4 mg) by mouth every 6 hours as needed 20 tablet 0    predniSONE (DELTASONE) 20 MG tablet Take 2 tablets (40 mg) by mouth daily for 5 days. 10 tablet 0    TRAZODONE HCL PO "       benzonatate (TESSALON) 200 MG capsule Take 1 capsule (200 mg) by mouth 3 times daily as needed for cough (Patient not taking: Reported on 9/10/2024) 21 capsule 0    OMEPRAZOLE PO  (Patient not taking: Reported on 5/8/2025)      ondansetron (ZOFRAN ODT) 4 MG ODT tab Take 1 tablet (4 mg) by mouth every 8 hours as needed for nausea. (Patient not taking: Reported on 5/8/2025) 15 tablet 0    predniSONE (DELTASONE) 20 MG tablet Take two tablets (= 40mg) each day for 5 (five) days (Patient not taking: Reported on 5/8/2025) 10 tablet 0    predniSONE (DELTASONE) 20 MG tablet Take two tablets (= 40mg) each day for 4 (four) days (Patient not taking: Reported on 5/8/2025) 8 tablet 0     Social History     Tobacco Use    Smoking status: Never    Smokeless tobacco: Never   Substance Use Topics    Alcohol use: Not on file       OBJECTIVE  /86   Pulse 68   Temp 98.6  F (37  C) (Tympanic)   Resp 16   Wt 51.7 kg (114 lb)   SpO2 98%   BMI 23.03 kg/m      Physical Exam  Vitals and nursing note reviewed.   Constitutional:       Appearance: Normal appearance. She is normal weight.   HENT:      Head: Normocephalic and atraumatic.      Right Ear: Tympanic membrane, ear canal and external ear normal.      Left Ear: Tympanic membrane, ear canal and external ear normal.      Nose: Nose normal.      Mouth/Throat:      Mouth: Mucous membranes are moist.      Pharynx: Oropharynx is clear.   Eyes:      Extraocular Movements: Extraocular movements intact.      Conjunctiva/sclera: Conjunctivae normal.   Cardiovascular:      Rate and Rhythm: Normal rate and regular rhythm.      Pulses: Normal pulses.      Heart sounds: Normal heart sounds.   Pulmonary:      Effort: Pulmonary effort is normal.      Breath sounds: Normal breath sounds. No wheezing, rhonchi or rales.   Musculoskeletal:      Cervical back: Normal range of motion.   Skin:     General: Skin is warm and dry.      Findings: No rash.   Neurological:      General: No focal  deficit present.      Mental Status: She is alert.   Psychiatric:         Mood and Affect: Mood normal.         Behavior: Behavior normal.         Labs:  No results found for this or any previous visit (from the past 24 hours).    ASSESSMENT:      ICD-10-CM    1. Exacerbation of asthma, unspecified asthma severity, unspecified whether persistent  J45.901 predniSONE (DELTASONE) 20 MG tablet           Medical Decision Making:    Differential Diagnosis:  URI Adult/Peds:  Asthma exacerbation, anxiety    Serious Comorbid Conditions:  Adult:  reviewed    PLAN:    Rx for prednisone.  Albuterol prn.  Follow up with pcp for possible changes to asthma treatment.  Discussed reasons to seek immediate medical attention.  Additionally if no improvement or worsening in one week, may follow up with PCP and/or UC.        Followup:    If not improving or if condition worsens, follow up with your Primary Care Provider, If not improving or if conditions worsens over the next 12-24 hours, go to the Emergency Department    There are no Patient Instructions on file for this visit.

## 2025-06-18 ENCOUNTER — ANCILLARY PROCEDURE (OUTPATIENT)
Dept: GENERAL RADIOLOGY | Facility: CLINIC | Age: 25
End: 2025-06-18
Attending: STUDENT IN AN ORGANIZED HEALTH CARE EDUCATION/TRAINING PROGRAM
Payer: COMMERCIAL

## 2025-06-18 ENCOUNTER — OFFICE VISIT (OUTPATIENT)
Dept: URGENT CARE | Facility: URGENT CARE | Age: 25
End: 2025-06-18

## 2025-06-18 VITALS
BODY MASS INDEX: 23.03 KG/M2 | OXYGEN SATURATION: 100 % | SYSTOLIC BLOOD PRESSURE: 130 MMHG | HEART RATE: 73 BPM | WEIGHT: 114 LBS | DIASTOLIC BLOOD PRESSURE: 85 MMHG | TEMPERATURE: 99 F | RESPIRATION RATE: 18 BRPM

## 2025-06-18 DIAGNOSIS — S89.91XA KNEE INJURY, RIGHT, INITIAL ENCOUNTER: ICD-10-CM

## 2025-06-18 DIAGNOSIS — S89.91XA KNEE INJURY, RIGHT, INITIAL ENCOUNTER: Primary | ICD-10-CM

## 2025-06-18 PROCEDURE — 73562 X-RAY EXAM OF KNEE 3: CPT | Mod: TC | Performed by: RADIOLOGY

## 2025-06-18 PROCEDURE — 3079F DIAST BP 80-89 MM HG: CPT | Performed by: STUDENT IN AN ORGANIZED HEALTH CARE EDUCATION/TRAINING PROGRAM

## 2025-06-18 PROCEDURE — 99213 OFFICE O/P EST LOW 20 MIN: CPT | Performed by: STUDENT IN AN ORGANIZED HEALTH CARE EDUCATION/TRAINING PROGRAM

## 2025-06-18 PROCEDURE — 3075F SYST BP GE 130 - 139MM HG: CPT | Performed by: STUDENT IN AN ORGANIZED HEALTH CARE EDUCATION/TRAINING PROGRAM

## 2025-06-18 NOTE — PROGRESS NOTES
Urgent Care Clinic Visit    Chief Complaint   Patient presents with    Urgent Care    Knee Pain     Pt presents with R knee pain, unable to bend or painful to walk, pt injured knee yesterday at work.               6/18/2025    11:45 AM   Additional Questions   Roomed by Mildred Page   Accompanied by nobody

## 2025-06-18 NOTE — PROGRESS NOTES
Assessment & Plan     Knee injury, right, initial encounter  Acute R knee pain and swelling after injury while running at work.  Felt twisting/jerking motion and had immediate pain in knee.  Did experience some swelling quickly after injury as well.  Attempted to ice and use OTC analgesics overnight without significant relief.  At work today unable to bear weight and significant increase in pain with flexion at knee joint.  On my evaluation there is swelling with effusion in right knee joint.  Patella intact.  Flexion extension limited by pain.  Negative anterior drawer.  X-ray obtained and negative for fracture or dislocation.  Will strongly suspect sprain/ligamentous injury, lower suspicion for complete tear.  Advised patient rest, ice and supportive cares.  Crutches provided in clinic, already had knee brace.  Note for restrictions at work provided.  Will send referral for orthopedics for visit in 1 to 2 weeks if not improving for consideration of advanced imaging to evaluate for ligamentous injury.  Return precautions discussed  - XR Knee Right 3 Views  - Orthopedic  Referral  - Crutches Order for DME - ONLY FOR DME             No follow-ups on file.  Follow-up with PCP and orthopedics as needed    Tasha Mott MD  [facility] PNOCHO Kline is a 24 year old female who presents to clinic today for the following health issues:  Chief Complaint   Patient presents with    Urgent Care    Knee Pain     Pt presents with R knee pain, unable to bend or painful to walk, pt injured knee yesterday at work.          6/18/2025    11:45 AM   Additional Questions   Roomed by Mildred Page   Accompanied by nobody     DAV Kline is a 24 year female with GERD and asthma who is presenting for evaluation of acute R knee injury.     MS Injury/Pain    Onset of symptoms was 1 day(s) ago.  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    Severity moderately severe  Current and Associated symptoms: Pain, Swelling, Tenderness, Decreased range of motion, and Stiffness  Denies  Bruising, Warmth, and Redness  Aggravating Factors: walking, running, weight-bearing, movement, standing, and particularly flexion at the knee   Therapies to improve symptoms include: ice yesterday and Tylenol  This is the first time this type of problem has occurred for this patient.     Tried going to work this morning and was unable to bend knee, keep up with children, had significant pain limiting activity. Tried using a knee brace that helps a little but still having lots of discomfort.       Review of Systems  Constitutional, HEENT, cardiovascular, pulmonary, gi and gu systems are negative, except as otherwise noted.      Objective    /85   Pulse 73   Temp 99  F (37.2  C) (Tympanic)   Resp 18   Wt 51.7 kg (114 lb)   LMP 05/30/2025   SpO2 100%   BMI 23.03 kg/m    Physical Exam   GENERAL: alert and no distress  NECK: no adenopathy, no asymmetry, masses, or scars  RESP: Breathing comfortably on room air  CV: regular rates and rhythm, peripheral pulses strong, and no peripheral edema  ORTHO: Knee Exam: Inspection: AP/lateral alignment normal, small effusion, developing bruise overlying the lateral border  Tender: lateral patellar facet, medial patellar facet, patella tendon  Non-tender: medial tibial plateau, lateral tibial plateau, medial femoral condyle, lateral femoral condyle, distal IT band  Active Range of Motion: pain with flexion, pain with extension  Special tests: normal Valgus stress test, no apprehension with lateral stress of the patella, negative anterior drawer    Also examined: hip full range of motion, no leg length difference, foot strong dorsalis pedis pulse   SKIN: no suspicious lesions or rashes  NEURO:  Normal strength and tone, mentation intact and speech normal  PSYCH: mentation appears normal, affect normal/bright    XR Knee Right 3 Views  Result Date: 6/18/2025  EXAM: XR KNEE RIGHT 3 VIEWS LOCATION: [facility] DATE: 6/18/2025 INDICATION: 24 year female with acute R knee pain, swelling and developing bruise after twisting injury. Point tenderness over patella. Eval for injury COMPARISON: None.     IMPRESSION: Normal joint spaces and alignment. No fracture or joint effusion.

## 2025-06-18 NOTE — LETTER
June 18, 2025      Raisa Lovett  [address]  [facility] [place] [facility] [address]        To Whom It May Concern:    Raisa Lovett was seen in our clinic. 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 She should be given a period of time to sit and rest for 10-15 minutes every 30 minutes to 1 hour until her symptoms improve.       Sincerely,      Tasha Mott        Electronically signed

## 2025-06-18 NOTE — PATIENT INSTRUCTIONS
It was fidel to see you in clinic today. You were seen for knee pain after an injury at work.     Your XR shows no evidence of fracture or dislocation which is great.     I recommend you continue to wear your knee brace and rest, ice, and elevate your leg as needed   Recommend tylenol and ibuprofen or naproxen for pain     We will provide crutches today to help you with getting around    I have also put in a referral for you to follow up with the sports medicine/bone doctors in about 1-2 weeks if your pain is not improved by then please go for evaluation and consideration of advanced imaging for a ligament injury.     Tasha Mott MD   Internal Medicine-Pediatrics

## 2025-06-22 SDOH — HEALTH STABILITY: PHYSICAL HEALTH: ON AVERAGE, HOW MANY DAYS PER WEEK DO YOU ENGAGE IN MODERATE TO STRENUOUS EXERCISE (LIKE A BRISK WALK)?: 0 DAYS

## 2025-06-22 SDOH — HEALTH STABILITY: PHYSICAL HEALTH: ON AVERAGE, HOW MANY MINUTES DO YOU ENGAGE IN EXERCISE AT THIS LEVEL?: 0 MIN

## 2025-06-23 ENCOUNTER — OFFICE VISIT (OUTPATIENT)
Dept: ORTHOPEDICS | Facility: CLINIC | Age: 25
End: 2025-06-23
Attending: STUDENT IN AN ORGANIZED HEALTH CARE EDUCATION/TRAINING PROGRAM
Payer: OTHER MISCELLANEOUS

## 2025-06-23 DIAGNOSIS — M25.561 PATELLOFEMORAL JOINT PAIN, RIGHT: Primary | ICD-10-CM

## 2025-06-23 DIAGNOSIS — S89.91XA KNEE INJURY, RIGHT, INITIAL ENCOUNTER: ICD-10-CM

## 2025-06-23 DIAGNOSIS — M25.561 ACUTE PAIN OF RIGHT KNEE: ICD-10-CM

## 2025-06-23 PROCEDURE — 99204 OFFICE O/P NEW MOD 45 MIN: CPT | Performed by: STUDENT IN AN ORGANIZED HEALTH CARE EDUCATION/TRAINING PROGRAM

## 2025-06-23 RX ORDER — DICLOFENAC SODIUM 75 MG/1
75 TABLET, DELAYED RELEASE ORAL 2 TIMES DAILY
Qty: 14 TABLET | Refills: 0 | Status: SHIPPED | OUTPATIENT
Start: 2025-06-23

## 2025-06-23 NOTE — LETTER
June 23, 2025      Raisa Lovett  [address] [place] [facility] [address]        To Whom It May Concern:    Raisa Lovett  was seen on 6/23/2025.  Please excuse her from work from 6/23/2025 until 7/7/2025  due to injury.    Sincerely,        Fabián Coelho, DO

## 2025-06-23 NOTE — LETTER
6/23/2025      Raisa Lovett  [address]  [facility] [address]      Dear Colleague,    Thank you for referring your patient, Raisa Lovett, to the [facility] [place]. Please see a copy of my visit note below.    ASSESSMENT & PLAN    Raisa was seen today for pain.    Diagnoses and all orders for this visit:    Patellofemoral joint pain, right  -     Physical Therapy  Referral; Future  -     diclofenac (VOLTAREN) 75 MG EC tablet; Take 1 tablet (75 mg) by mouth 2 times daily.    Knee injury, right, initial encounter  -     Orthopedic  Referral  -     Physical Therapy  Referral; Future  -     diclofenac (VOLTAREN) 75 MG EC tablet; Take 1 tablet (75 mg) by mouth 2 times daily.    Acute pain of right knee  -     Physical Therapy  Referral; Future  -     diclofenac (VOLTAREN) 75 MG EC tablet; Take 1 tablet (75 mg) by mouth 2 times daily.      This issue is acute and Unchanged. Raisa presents our clinic today to discuss her acute right knee pain.  She reports acute pain when suffering a twisting injury while at work, with pain in the anterior aspect of the knee.  Following day she had another acute injury where a child stepped directly on the medial aspect of her knee.  She was seen in urgent care where radiographs were taken, these were reviewed with the patient and were unremarkable for any acute or chronic bony abnormalities.  She was given a brace and crutches to will be able to ambulate, and she has been needing to utilize these crutches with weightbearing due to pain.  On examination today, she localizes her pain over the patellar tendon and medial joint line.  She has significant pain with passive flexion of the knee, however has preserved range of motion.  She is tender palpation in the medial joint line as well as over the patellar tendon, and has pain with resisted knee extension.  She has no pain with Dante's testing.   She otherwise has no ligamentous laxity.  Overall, her symptoms seem most consistent with acute patellar tendinitis and possible bone bruising from her acute injuries.  There does not appear to be any evidence of intra-articular pathology on examination today, she has no effusion, no ligamentous laxity, no pain with Dante's testing.  We will begin with conservative management with anti-inflammatory medicines and physical therapy, and have the patient follow-up closely to determine if advanced imaging is needed.  We determined the following plan:  - Diclofenac 75 mg twice daily for 7 days sent to pharmacy  - Physical therapy referral placed  - She can continue to weight-bear using the crutches and brace as needed  - She will follow-up with me in 2 weeks for further evaluation and treatment, depending on patient's exam at that time could possibly order advanced imaging of the knee    Fabián Coelho [facility] [place]    -----  Chief Complaint   Patient presents with     Right Knee - Pain       SUBJECTIVE  Raisa Lovett is a/an 24 year old female who is seen in consultation at the request of  Tasha Mott M.D. for evaluation of right knee.     The patient is seen by themselves.    Onset: 6/17/25 - 6 day(s) ago. 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 The following day another child stepped on her knee. Patient has WC claim open.  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  CV: distal perfusion intact =  Resp: normal respiratory effort without conversational dyspnea   Psych: normal mood and affect  Gait: antalgic and crutches  Neuro: Normal light sensory exam of RL extremity     Right Knee exam  Gait: Normal  Alignment:  [x] Normal  [] Anatomic valgus  [] Anatomic varus  Inspection: [x] Normal  [] Ecchymosis present []Other   Palpation:  Joint Tenderness: [] No Tenderness  [x] MJL [] LJL [] MCL Margin [] LCL Margin [] Pes Anserine [] Distal Quadricep  [] Other   Peripatellar Tenderness: [] None [] Lateral pole [] Medial pole [] Superior pole [x] Inferior pole    Patellar tendon pain: [] None [x] Present    Patellar apprehension: [x] None [] Present    Patellar motion: [x] Normal [] Abnormal  Crepitus: [x] None [] Present  Effusion: [x] None [] Trace [] 1+ [] 2+ [] 3+  Range of motion:  Flexion: 135- painful, Extension: 0  Strength:  [x] Full in all planes, including intact extensor mechanism [] Limited as described  Neurologic: Normal sensation   Vascular: Normal pulses   Special tests:   Lachman: Negative  Anterior Drawer: Negative  Sag/quad Activation: NP  Posterior Drawer: Negative  Valgus Stress: Negative at 0/30  Varus Stress: Negative at 0/30  Dante's: Negative  Thessaly: NP     Other notable findings/comments: None       RADIOLOGY:  Final results and radiologist's interpretation, available in the [facility] health record.  Images were reviewed with the patient in the office today.  My personal interpretation of the performed imaging: Radiographs from 6/18/2025 reviewed and show normal joint spacing and alignment of the knee.  No acute fractures or bony abnormalities.          Again, thank you for allowing me to participate in the care of your patient.        Sincerely,        Fabián Coelho DO    Electronically signed

## 2025-06-23 NOTE — PROGRESS NOTES
ASSESSMENT & PLAN    Raisa was seen today for pain.    Diagnoses and all orders for this visit:    Patellofemoral joint pain, right  -     Physical Therapy  Referral; Future  -     diclofenac (VOLTAREN) 75 MG EC tablet; Take 1 tablet (75 mg) by mouth 2 times daily.    Knee injury, right, initial encounter  -     Orthopedic  Referral  -     Physical Therapy  Referral; Future  -     diclofenac (VOLTAREN) 75 MG EC tablet; Take 1 tablet (75 mg) by mouth 2 times daily.    Acute pain of right knee  -     Physical Therapy  Referral; Future  -     diclofenac (VOLTAREN) 75 MG EC tablet; Take 1 tablet (75 mg) by mouth 2 times daily.      This issue is acute and Unchanged. Raisa presents our clinic today to discuss her acute right knee pain.  She reports acute pain when suffering a twisting injury while at work, with pain in the anterior aspect of the knee.  Following day she had another acute injury where a child stepped directly on the medial aspect of her knee.  She was seen in urgent care where radiographs were taken, these were reviewed with the patient and were unremarkable for any acute or chronic bony abnormalities.  She was given a brace and crutches to will be able to ambulate, and she has been needing to utilize these crutches with weightbearing due to pain.  On examination today, she localizes her pain over the patellar tendon and medial joint line.  She has significant pain with passive flexion of the knee, however has preserved range of motion.  She is tender palpation in the medial joint line as well as over the patellar tendon, and has pain with resisted knee extension.  She has no pain with Dante's testing.  She otherwise has no ligamentous laxity.  Overall, her symptoms seem most consistent with acute patellar tendinitis and possible bone bruising from her acute injuries.  There does not appear to be any evidence of intra-articular pathology on examination today, she has  no effusion, no ligamentous laxity, no pain with Dante's testing.  We will begin with conservative management with anti-inflammatory medicines and physical therapy, and have the patient follow-up closely to determine if advanced imaging is needed.  We determined the following plan:  - Diclofenac 75 mg twice daily for 7 days sent to pharmacy  - Physical therapy referral placed  - She can continue to weight-bear using the crutches and brace as needed  - She will follow-up with me in 2 weeks for further evaluation and treatment, depending on patient's exam at that time could possibly order advanced imaging of the knee    Fabián Coelho DO  [facility] [place]    -----  Chief Complaint   Patient presents with    Right Knee - Pain       SUBJECTIVE  Raisa Lovett is a/an 24 year old female who is seen in consultation at the request of  Tasha Mott M.D. for evaluation of right knee.     The patient is seen by themselves.    Onset: 6/17/25 - 6 day(s) ago. 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 The following day another child stepped on her knee. Patient has WC claim open.  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  CV: distal perfusion intact =  Resp: normal respiratory effort without conversational dyspnea   Psych: normal mood and  affect  Gait: antalgic and crutches  Neuro: Normal light sensory exam of RL extremity     Right Knee exam  Gait: Normal  Alignment:  [x] Normal  [] Anatomic valgus  [] Anatomic varus  Inspection: [x] Normal  [] Ecchymosis present []Other   Palpation:  Joint Tenderness: [] No Tenderness  [x] MJL [] LJL [] MCL Margin [] LCL Margin [] Pes Anserine [] Distal Quadricep  [] Other   Peripatellar Tenderness: [] None [] Lateral pole [] Medial pole [] Superior pole [x] Inferior pole    Patellar tendon pain: [] None [x] Present    Patellar apprehension: [x] None [] Present    Patellar motion: [x] Normal [] Abnormal  Crepitus: [x] None [] Present  Effusion: [x] None [] Trace [] 1+ [] 2+ [] 3+  Range of motion:  Flexion: 135- painful, Extension: 0  Strength:  [x] Full in all planes, including intact extensor mechanism [] Limited as described  Neurologic: Normal sensation   Vascular: Normal pulses   Special tests:   Lachman: Negative  Anterior Drawer: Negative  Sag/quad Activation: NP  Posterior Drawer: Negative  Valgus Stress: Negative at 0/30  Varus Stress: Negative at 0/30  Dante's: Negative  Thessaly: NP     Other notable findings/comments: None       RADIOLOGY:  Final results and radiologist's interpretation, available in the [facility] health record.  Images were reviewed with the patient in the office today.  My personal interpretation of the performed imaging: Radiographs from 6/18/2025 reviewed and show normal joint spacing and alignment of the knee.  No acute fractures or bony abnormalities.

## 2025-06-24 ENCOUNTER — THERAPY VISIT (OUTPATIENT)
Dept: PHYSICAL THERAPY | Facility: CLINIC | Age: 25
End: 2025-06-24
Attending: STUDENT IN AN ORGANIZED HEALTH CARE EDUCATION/TRAINING PROGRAM
Payer: OTHER MISCELLANEOUS

## 2025-06-24 DIAGNOSIS — M25.561 PATELLOFEMORAL JOINT PAIN, RIGHT: ICD-10-CM

## 2025-06-24 DIAGNOSIS — S89.91XA KNEE INJURY, RIGHT, INITIAL ENCOUNTER: ICD-10-CM

## 2025-06-24 DIAGNOSIS — M25.561 ACUTE PAIN OF RIGHT KNEE: ICD-10-CM

## 2025-06-24 PROCEDURE — 97110 THERAPEUTIC EXERCISES: CPT | Mod: GP | Performed by: PHYSICAL THERAPIST

## 2025-06-24 PROCEDURE — 97161 PT EVAL LOW COMPLEX 20 MIN: CPT | Mod: GP | Performed by: PHYSICAL THERAPIST

## 2025-06-24 PROCEDURE — 97530 THERAPEUTIC ACTIVITIES: CPT | Mod: GP | Performed by: PHYSICAL THERAPIST

## 2025-06-24 ASSESSMENT — ACTIVITIES OF DAILY LIVING (ADL)
KNEE_ACTIVITY_OF_DAILY_LIVING_SUM: 3
LIMPING: THE SYMPTOM PREVENTS ME FROM ALL DAILY ACTIVITIES
WEAKNESS: THE SYMPTOM PREVENTS ME FROM ALL DAILY ACTIVITIES
AS_A_RESULT_OF_YOUR_KNEE_INJURY,_HOW_WOULD_YOU_RATE_YOUR_CURRENT_LEVEL_OF_DAILY_ACTIVITY?: SEVERELY ABNORMAL
PAIN: THE SYMPTOM PREVENTS ME FROM ALL DAILY ACTIVITIES
HOW_WOULD_YOU_RATE_THE_OVERALL_FUNCTION_OF_YOUR_KNEE_DURING_YOUR_USUAL_DAILY_ACTIVITIES?: NORMAL
SWELLING: THE SYMPTOM PREVENTS ME FROM ALL DAILY ACTIVITIES
KNEEL ON THE FRONT OF YOUR KNEE: I AM UNABLE TO DO THE ACTIVITY
WEAKNESS: THE SYMPTOM PREVENTS ME FROM ALL DAILY ACTIVITIES
STAND: ACTIVITY IS VERY DIFFICULT
HOW_WOULD_YOU_RATE_THE_CURRENT_FUNCTION_OF_YOUR_KNEE_DURING_YOUR_USUAL_DAILY_ACTIVITIES_ON_A_SCALE_FROM_0_TO_100_WITH_100_BEING_YOUR_LEVEL_OF_KNEE_FUNCTION_PRIOR_TO_YOUR_INJURY_AND_0_BEING_THE_INABILITY_TO_PERFORM_ANY_OF_YOUR_USUAL_DAILY_ACTIVITIES?: 0
GO DOWN STAIRS: I AM UNABLE TO DO THE ACTIVITY
PLEASE_INDICATE_YOR_PRIMARY_REASON_FOR_REFERRAL_TO_THERAPY:: KNEE
STIFFNESS: THE SYMPTOM PREVENTS ME FROM ALL DAILY ACTIVITIES
STAND: ACTIVITY IS VERY DIFFICULT
KNEEL ON THE FRONT OF YOUR KNEE: I AM UNABLE TO DO THE ACTIVITY
STIFFNESS: THE SYMPTOM PREVENTS ME FROM ALL DAILY ACTIVITIES
WALK: I AM UNABLE TO DO THE ACTIVITY
SWELLING: THE SYMPTOM PREVENTS ME FROM ALL DAILY ACTIVITIES
GO UP STAIRS: I AM UNABLE TO DO THE ACTIVITY
SIT WITH YOUR KNEE BENT: ACTIVITY IS VERY DIFFICULT
RISE FROM A CHAIR: ACTIVITY IS VERY DIFFICULT
AS_A_RESULT_OF_YOUR_KNEE_INJURY,_HOW_WOULD_YOU_RATE_YOUR_CURRENT_LEVEL_OF_DAILY_ACTIVITY?: SEVERELY ABNORMAL
SIT WITH YOUR KNEE BENT: ACTIVITY IS VERY DIFFICULT
LIMPING: THE SYMPTOM PREVENTS ME FROM ALL DAILY ACTIVITIES
GO DOWN STAIRS: I AM UNABLE TO DO THE ACTIVITY
GIVING WAY, BUCKLING OR SHIFTING OF KNEE: THE SYMPTOM PREVENTS ME FROM ALL DAILY ACTIVITIES
GO UP STAIRS: I AM UNABLE TO DO THE ACTIVITY
KNEE_ACTIVITY_OF_DAILY_LIVING_SCORE: 4.29
RAW_SCORE: 3
HOW_WOULD_YOU_RATE_THE_CURRENT_FUNCTION_OF_YOUR_KNEE_DURING_YOUR_USUAL_DAILY_ACTIVITIES_ON_A_SCALE_FROM_0_TO_100_WITH_100_BEING_YOUR_LEVEL_OF_KNEE_FUNCTION_PRIOR_TO_YOUR_INJURY_AND_0_BEING_THE_INABILITY_TO_PERFORM_ANY_OF_YOUR_USUAL_DAILY_ACTIVITIES?: 0
SQUAT: I AM UNABLE TO DO THE ACTIVITY
HOW_WOULD_YOU_RATE_THE_OVERALL_FUNCTION_OF_YOUR_KNEE_DURING_YOUR_USUAL_DAILY_ACTIVITIES?: NORMAL
SQUAT: I AM UNABLE TO DO THE ACTIVITY
GIVING WAY, BUCKLING OR SHIFTING OF KNEE: THE SYMPTOM PREVENTS ME FROM ALL DAILY ACTIVITIES
PAIN: THE SYMPTOM PREVENTS ME FROM ALL DAILY ACTIVITIES
WALK: I AM UNABLE TO DO THE ACTIVITY
RISE FROM A CHAIR: ACTIVITY IS VERY DIFFICULT

## 2025-06-24 NOTE — PROGRESS NOTES
PHYSICAL THERAPY EVALUATION  Type of Visit: Evaluation        Fall Risk Screen:  Have you fallen 2 or more times in the past year?: No  Have you fallen and had an injury in the past year?: No  Fall screen comments: no blance deficits    Subjective         Presenting condition or subjective complaint: injured my knee while working. 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 She has had difficulty bending the knee and putting weight on the R LE since then. She is walking with crutches and is off work. No hx of R knee injury.  Date of onset: 25    Relevant medical history: Anemia; Asthma; Concussions; Depression; Migraines or headaches   Past Medical History:   Diagnosis Date    Depression     Uncomplicated asthma        Dates & types of surgery: Placenta removal , Cyst Removal  No past surgical history on file.      Prior diagnostic imaging/testing results: MRI; CT scan; X-ray; EMG     Prior therapy history for the same diagnosis, illness or injury: No      Prior Level of Function  Transfers: Independent  Ambulation: Independent  ADL: Independent    Living Environment  Social support: With family members   Type of home: House   Stairs to enter the home: Yes 2 Is there a railing: No     Ramp: No   Stairs inside the home: Yes 12 Is there a railing: Yes     Help at home: Assist for driving and community activities  Equipment owned: Crutches     Employment: Yes registered behavior technician  Hobbies/Interests: spending time with my family and outside    Patient goals for therapy: Work and be a mom, have been limited to just laying in bed    Pain assessment: Location: R anterior knee/Ratin/10     Objective   KNEE EVALUATION  PAIN: Pain Level at Rest: 3/10  Pain Level with Use: 9/10  Pain is Exacerbated By: bending knee, walking, stairs  INTEGUMENTARY (edema, incisions): very minimal edema R  knee  GAIT:  Weightbearing Status: WBAT  Assistive Device(s): Crutches (bilateral)  Gait Deviations: puts very limited weight on the R  BALANCE/PROPRIOCEPTION: not tested due to physical limitations  WEIGHTBEARING ALIGNMENT: WNL  ROM: AROM R 0-0-60; L 0-0-148. PROM R 0-0-70  STRENGTH: R quad 4-/5; hams 4-/5; hip flex 4-/5  SPECIAL TESTS: Dante's (-) R; valgus test slightly (+) R  PALPATION: tender at inf patellar tendon  JOINT MOBILITY: WNL    Assessment & Plan   CLINICAL IMPRESSIONS  Medical Diagnosis: R knee pain    Treatment Diagnosis: R knee pain   Impression/Assessment: Patient is a 24 year old female with R knee complaints.  The following significant findings have been identified: Pain, Decreased ROM/flexibility, Decreased strength, Impaired gait, Impaired muscle performance, and Decreased activity tolerance. These impairments interfere with their ability to perform self care tasks, work tasks, recreational activities, household chores, driving , household mobility, and community mobility as compared to previous level of function.     Clinical Decision Making (Complexity):  Clinical Presentation: Stable/Uncomplicated  Clinical Presentation Rationale: based on medical and personal factors listed in PT evaluation  Clinical Decision Making (Complexity): Low complexity    PLAN OF CARE  Treatment Interventions:  Interventions: Gait Training, Neuromuscular Re-education, Therapeutic Activity, Therapeutic Exercise, Self-Care/Home Management    Long Term Goals     PT Goal 1  Goal Identifier: ambulation  Goal Description: minutes patient will be able to walk without AD and gait deviation - 30  Rationale: to maximize safety and independence within the community;to maximize safety and independence within the home  Target Date: 08/24/25      Frequency of Treatment: 1 x week  Duration of Treatment: 8 weeks    Education Assessment:   Learner/Method: Patient;Listening;Demonstration    Risks and benefits of  evaluation/treatment have been explained.   Patient/Family/caregiver agrees with Plan of Care.     Evaluation Time:     PT Quinten, Low Complexity Minutes (69142): 16       Signing Clinician: Mari Johnson PT

## 2025-06-30 ENCOUNTER — THERAPY VISIT (OUTPATIENT)
Dept: PHYSICAL THERAPY | Facility: CLINIC | Age: 25
End: 2025-06-30
Attending: STUDENT IN AN ORGANIZED HEALTH CARE EDUCATION/TRAINING PROGRAM
Payer: OTHER MISCELLANEOUS

## 2025-06-30 DIAGNOSIS — M25.561 ACUTE PAIN OF RIGHT KNEE: Primary | ICD-10-CM

## 2025-06-30 PROCEDURE — 97530 THERAPEUTIC ACTIVITIES: CPT | Mod: GP | Performed by: PHYSICAL THERAPIST

## 2025-06-30 PROCEDURE — 97110 THERAPEUTIC EXERCISES: CPT | Mod: GP | Performed by: PHYSICAL THERAPIST

## 2025-07-07 ENCOUNTER — OFFICE VISIT (OUTPATIENT)
Dept: ORTHOPEDICS | Facility: CLINIC | Age: 25
End: 2025-07-07
Payer: OTHER MISCELLANEOUS

## 2025-07-07 DIAGNOSIS — S89.91XD KNEE INJURY, RIGHT, SUBSEQUENT ENCOUNTER: Primary | ICD-10-CM

## 2025-07-07 DIAGNOSIS — M25.561 PATELLOFEMORAL JOINT PAIN, RIGHT: ICD-10-CM

## 2025-07-07 PROCEDURE — 99213 OFFICE O/P EST LOW 20 MIN: CPT | Performed by: STUDENT IN AN ORGANIZED HEALTH CARE EDUCATION/TRAINING PROGRAM

## 2025-07-07 SDOH — HEALTH STABILITY: PHYSICAL HEALTH: ON AVERAGE, HOW MANY MINUTES DO YOU ENGAGE IN EXERCISE AT THIS LEVEL?: 20 MIN

## 2025-07-07 SDOH — HEALTH STABILITY: PHYSICAL HEALTH: ON AVERAGE, HOW MANY DAYS PER WEEK DO YOU ENGAGE IN MODERATE TO STRENUOUS EXERCISE (LIKE A BRISK WALK)?: 2 DAYS

## 2025-07-07 NOTE — LETTER
7/7/2025      Raisa Lovett  [address]  [facility] [address]      Dear Colleague,    Thank you for referring your patient, Raisa Lovett, to the [facility]. Please see a copy of my visit note below.    ASSESSMENT & PLAN    Raisa was seen today for pain.    Diagnoses and all orders for this visit:    Knee injury, right, subsequent encounter    Patellofemoral joint pain, right      This issue is acute and Improving. Raisa presents our clinic today to follow-up on her acute right knee pain.  Since her last visit, she reports overall improvement in her knee pain she has graduated to ambulating with a single crutch.  She has noticed some new pain in the ball of her foot when weightbearing as well as a feeling of numbness through the lower extremity that occurs after her physical therapy exercises.  On examination today, she has full range of motion at the knee without pain and her examination is otherwise unremarkable.  She has no tenderness palpation over the lumbar spinous processes, a negative slump test, and a negative straight leg raise.  She otherwise has 5/5 strength of the lower extremity and is intact sensation to light touch.  Overall, I suspect her other symptoms are compensatory due to her abnormal gait and utilizing the crutch, and would suspect that they would improve as she returns to walking normally.  We determined the following plan:  - Patient will advance herself off of the crutch and out of her brace and begin to ambulate normally  - Work restriction note provided for 1 week off of work to allow her to return to normal activity and weightbearing, at that time she can return to work without restrictions  - She will follow-up with me over the next 2 to 3 weeks if her other symptoms fail to improve, or if she develops return of her knee pain.      Fabián Coelho,   [facility]    -----  Chief  Complaint   Patient presents with     Right Knee - Pain       SUBJECTIVE  Raisa Lovett is a/an 24 year old female who is seen in consultation at the request of  Tasha Mott M.D. for evaluation of right knee.     The patient is seen by themselves.      Onset: 2.5 week(s) ago. 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  CV: distal perfusion intact   Resp: normal respiratory effort without conversational dyspnea   Psych: normal mood and affect  Gait: crutches  Neuro: Normal light sensory exam of RL extremity     Right Knee exam  Gait: Normal  Alignment:  [x] Normal  [] Anatomic valgus  [] Anatomic varus  Inspection: [x] Normal  [] Ecchymosis present []Other   Palpation:  Joint Tenderness: [x] No Tenderness  [] MJL [] LJL [] MCL Margin [] LCL Margin [] Pes Anserine [] Distal Quadricep  [] Other   Peripatellar Tenderness: [x] None [] Lateral pole [] Medial pole [] Superior pole [] Inferior pole    Patellar tendon pain: [x] None [] Present    Patellar apprehension: [x] None [] Present    Patellar motion: [x] Normal []  Abnormal  Crepitus: [x] None [] Present  Effusion: [x] None [] Trace [] 1+ [] 2+ [] 3+  Range of motion:  Flexion: 135, Extension: 0  Strength:  [x] Full in all planes, including intact extensor mechanism [] Limited as described  Neurologic: Normal sensation   Vascular: Normal pulses   Special tests:   Lachman: Negative  Anterior Drawer: Negative  Sag/quad Activation: NP  Posterior Drawer: Negative  Valgus Stress: Negative at 0/30  Varus Stress: Negative at 0/30  Dante's: Negative  Thessaly: NP     Other notable findings/comments: None       RADIOLOGY:  No new imaging taken in clinic today      Again, thank you for allowing me to participate in the care of your patient.        Sincerely,        Fabián Coelho, DO    Electronically signed

## 2025-07-07 NOTE — PROGRESS NOTES
ASSESSMENT & PLAN    Raisa was seen today for pain.    Diagnoses and all orders for this visit:    Knee injury, right, subsequent encounter    Patellofemoral joint pain, right      This issue is acute and Improving. Raisa presents our clinic today to follow-up on her acute right knee pain.  Since her last visit, she reports overall improvement in her knee pain she has graduated to ambulating with a single crutch.  She has noticed some new pain in the ball of her foot when weightbearing as well as a feeling of numbness through the lower extremity that occurs after her physical therapy exercises.  On examination today, she has full range of motion at the knee without pain and her examination is otherwise unremarkable.  She has no tenderness palpation over the lumbar spinous processes, a negative slump test, and a negative straight leg raise.  She otherwise has 5/5 strength of the lower extremity and is intact sensation to light touch.  Overall, I suspect her other symptoms are compensatory due to her abnormal gait and utilizing the crutch, and would suspect that they would improve as she returns to walking normally.  We determined the following plan:  - Patient will advance herself off of the crutch and out of her brace and begin to ambulate normally  - Work restriction note provided for 1 week off of work to allow her to return to normal activity and weightbearing, at that time she can return to work without restrictions  - She will follow-up with me over the next 2 to 3 weeks if her other symptoms fail to improve, or if she develops return of her knee pain.      Fabián Coelho [facility] [place]    -----  Chief Complaint   Patient presents with    Right Knee - Pain       SUBJECTIVE  Raisa Lovett is a/an 24 year old female who is seen in consultation at the request of  Tasha Mott M.D. for evaluation of right knee.     The patient is seen by themselves.      Onset:  2.5 week(s) ago. 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  CV: distal perfusion intact   Resp: normal respiratory effort without conversational dyspnea   Psych: normal mood and affect  Gait: crutches  Neuro: Normal light sensory exam of RL extremity     Right Knee exam  Gait: Normal  Alignment:  [x] Normal  [] Anatomic valgus  [] Anatomic varus  Inspection: [x] Normal  [] Ecchymosis present []Other   Palpation:  Joint Tenderness: [x] No Tenderness  [] MJL [] LJL [] MCL Margin [] LCL Margin [] Pes Anserine [] Distal Quadricep  [] Other   Peripatellar Tenderness: [x] None [] Lateral pole [] Medial pole [] Superior pole [] Inferior pole    Patellar tendon pain: [x] None [] Present    Patellar apprehension: [x] None [] Present    Patellar motion: [x] Normal [] Abnormal  Crepitus: [x] None [] Present  Effusion: [x] None [] Trace [] 1+ [] 2+ [] 3+  Range of motion:  Flexion: 135, Extension: 0  Strength:  [x] Full in all planes, including intact extensor mechanism [] Limited as described  Neurologic: Normal sensation   Vascular:  Normal pulses   Special tests:   Lachman: Negative  Anterior Drawer: Negative  Sag/quad Activation: NP  Posterior Drawer: Negative  Valgus Stress: Negative at 0/30  Varus Stress: Negative at 0/30  Dante's: Negative  Thessaly: NP     Other notable findings/comments: None       RADIOLOGY:  No new imaging taken in clinic today

## 2025-07-07 NOTE — LETTER
July 7, 2025      Raisa Lovett  [address] [facility] [address]        To Whom It May Concern:    Raisa Lovett  was seen on 7/7/2025.  Please excuse her from 1 week, until 7/14/2025 due to injury.         Sincerely,        Fabián Coelho, DO

## 2025-07-19 ENCOUNTER — HEALTH MAINTENANCE LETTER (OUTPATIENT)
Age: 25
End: 2025-07-19

## 2025-07-28 ENCOUNTER — THERAPY VISIT (OUTPATIENT)
Dept: PHYSICAL THERAPY | Facility: CLINIC | Age: 25
End: 2025-07-28
Payer: OTHER MISCELLANEOUS

## 2025-07-28 DIAGNOSIS — M25.561 ACUTE PAIN OF RIGHT KNEE: Primary | ICD-10-CM

## 2025-07-28 PROCEDURE — 97530 THERAPEUTIC ACTIVITIES: CPT | Mod: GP | Performed by: PHYSICAL THERAPIST

## 2025-07-28 PROCEDURE — 97110 THERAPEUTIC EXERCISES: CPT | Mod: GP | Performed by: PHYSICAL THERAPIST

## 2025-08-18 ENCOUNTER — THERAPY VISIT (OUTPATIENT)
Dept: PHYSICAL THERAPY | Facility: CLINIC | Age: 25
End: 2025-08-18
Payer: OTHER MISCELLANEOUS

## 2025-08-18 DIAGNOSIS — M25.561 ACUTE PAIN OF RIGHT KNEE: Primary | ICD-10-CM

## 2025-08-18 PROCEDURE — 97110 THERAPEUTIC EXERCISES: CPT | Mod: GP | Performed by: PHYSICAL THERAPIST

## 2025-08-18 PROCEDURE — 97530 THERAPEUTIC ACTIVITIES: CPT | Mod: GP | Performed by: PHYSICAL THERAPIST

## 2025-08-22 ENCOUNTER — HOSPITAL ENCOUNTER (EMERGENCY)
Facility: CLINIC | Age: 25
Discharge: HOME | End: 2025-08-22
Attending: EMERGENCY MEDICINE | Admitting: EMERGENCY MEDICINE
Payer: COMMERCIAL

## 2025-08-22 ENCOUNTER — APPOINTMENT (OUTPATIENT)
Dept: CT IMAGING | Facility: CLINIC | Age: 25
End: 2025-08-22
Attending: EMERGENCY MEDICINE
Payer: COMMERCIAL

## 2025-08-22 VITALS
HEART RATE: 78 BPM | TEMPERATURE: 97.2 F | RESPIRATION RATE: 16 BRPM | WEIGHT: 115 LBS | HEIGHT: 59 IN | BODY MASS INDEX: 23.18 KG/M2 | DIASTOLIC BLOOD PRESSURE: 73 MMHG | SYSTOLIC BLOOD PRESSURE: 110 MMHG | OXYGEN SATURATION: 100 %

## 2025-08-22 DIAGNOSIS — S06.0X0A CLOSED HEAD INJURY WITH CONCUSSION, WITHOUT LOSS OF CONSCIOUSNESS, INITIAL ENCOUNTER: Primary | ICD-10-CM

## 2025-08-22 PROCEDURE — 70450 CT HEAD/BRAIN W/O DYE: CPT

## 2025-08-22 PROCEDURE — 99284 EMERGENCY DEPT VISIT MOD MDM: CPT | Mod: 25 | Performed by: EMERGENCY MEDICINE

## 2025-08-22 PROCEDURE — 250N000011 HC RX IP 250 OP 636: Performed by: EMERGENCY MEDICINE

## 2025-08-22 RX ORDER — ONDANSETRON 4 MG/1
4 TABLET, ORALLY DISINTEGRATING ORAL EVERY 6 HOURS PRN
Qty: 15 TABLET | Refills: 0 | Status: SHIPPED | OUTPATIENT
Start: 2025-08-22

## 2025-08-22 RX ORDER — ONDANSETRON 4 MG/1
4 TABLET, ORALLY DISINTEGRATING ORAL ONCE
Status: COMPLETED | OUTPATIENT
Start: 2025-08-22 | End: 2025-08-22

## 2025-08-22 RX ADMIN — ONDANSETRON 4 MG: 4 TABLET, ORALLY DISINTEGRATING ORAL at 09:16

## 2025-08-22 ASSESSMENT — COLUMBIA-SUICIDE SEVERITY RATING SCALE - C-SSRS
2. HAVE YOU ACTUALLY HAD ANY THOUGHTS OF KILLING YOURSELF IN THE PAST MONTH?: NO
1. IN THE PAST MONTH, HAVE YOU WISHED YOU WERE DEAD OR WISHED YOU COULD GO TO SLEEP AND NOT WAKE UP?: NO
6. HAVE YOU EVER DONE ANYTHING, STARTED TO DO ANYTHING, OR PREPARED TO DO ANYTHING TO END YOUR LIFE?: NO

## 2025-08-22 ASSESSMENT — ACTIVITIES OF DAILY LIVING (ADL)
ADLS_ACUITY_SCORE: 41
ADLS_ACUITY_SCORE: 41